# Patient Record
Sex: FEMALE | Race: WHITE | NOT HISPANIC OR LATINO | URBAN - METROPOLITAN AREA
[De-identification: names, ages, dates, MRNs, and addresses within clinical notes are randomized per-mention and may not be internally consistent; named-entity substitution may affect disease eponyms.]

---

## 2024-03-23 ENCOUNTER — INPATIENT (INPATIENT)
Facility: HOSPITAL | Age: 24
LOS: 2 days | Discharge: HOME | DRG: 315 | End: 2024-03-26
Attending: INTERNAL MEDICINE | Admitting: STUDENT IN AN ORGANIZED HEALTH CARE EDUCATION/TRAINING PROGRAM
Payer: COMMERCIAL

## 2024-03-23 VITALS
SYSTOLIC BLOOD PRESSURE: 112 MMHG | TEMPERATURE: 98 F | DIASTOLIC BLOOD PRESSURE: 73 MMHG | OXYGEN SATURATION: 99 % | RESPIRATION RATE: 20 BRPM | HEART RATE: 81 BPM

## 2024-03-23 DIAGNOSIS — N39.0 URINARY TRACT INFECTION, SITE NOT SPECIFIED: ICD-10-CM

## 2024-03-23 DIAGNOSIS — I51.4 MYOCARDITIS, UNSPECIFIED: ICD-10-CM

## 2024-03-23 LAB
ADD ON TEST-SPECIMEN IN LAB: SIGNIFICANT CHANGE UP
ALBUMIN SERPL ELPH-MCNC: 3.9 G/DL — SIGNIFICANT CHANGE UP (ref 3.3–5)
ALBUMIN SERPL ELPH-MCNC: 4 G/DL — SIGNIFICANT CHANGE UP (ref 3.3–5)
ALP SERPL-CCNC: 86 U/L — SIGNIFICANT CHANGE UP (ref 40–120)
ALP SERPL-CCNC: 90 U/L — SIGNIFICANT CHANGE UP (ref 40–120)
ALT FLD-CCNC: 31 U/L — SIGNIFICANT CHANGE UP (ref 10–45)
ALT FLD-CCNC: 33 U/L — SIGNIFICANT CHANGE UP (ref 10–45)
ANION GAP SERPL CALC-SCNC: 11 MMOL/L — SIGNIFICANT CHANGE UP (ref 5–17)
ANION GAP SERPL CALC-SCNC: 11 MMOL/L — SIGNIFICANT CHANGE UP (ref 5–17)
APPEARANCE UR: CLEAR — SIGNIFICANT CHANGE UP
AST SERPL-CCNC: 42 U/L — HIGH (ref 10–40)
AST SERPL-CCNC: 43 U/L — HIGH (ref 10–40)
BACTERIA # UR AUTO: ABNORMAL /HPF
BASOPHILS # BLD AUTO: 0.02 K/UL — SIGNIFICANT CHANGE UP (ref 0–0.2)
BASOPHILS # BLD AUTO: 0.03 K/UL — SIGNIFICANT CHANGE UP (ref 0–0.2)
BASOPHILS NFR BLD AUTO: 0.3 % — SIGNIFICANT CHANGE UP (ref 0–2)
BASOPHILS NFR BLD AUTO: 0.4 % — SIGNIFICANT CHANGE UP (ref 0–2)
BILIRUB SERPL-MCNC: <0.2 MG/DL — SIGNIFICANT CHANGE UP (ref 0.2–1.2)
BILIRUB SERPL-MCNC: <0.2 MG/DL — SIGNIFICANT CHANGE UP (ref 0.2–1.2)
BILIRUB UR-MCNC: NEGATIVE — SIGNIFICANT CHANGE UP
BUN SERPL-MCNC: 7 MG/DL — SIGNIFICANT CHANGE UP (ref 7–23)
BUN SERPL-MCNC: 8 MG/DL — SIGNIFICANT CHANGE UP (ref 7–23)
CALCIUM SERPL-MCNC: 10.1 MG/DL — SIGNIFICANT CHANGE UP (ref 8.4–10.5)
CALCIUM SERPL-MCNC: 9.7 MG/DL — SIGNIFICANT CHANGE UP (ref 8.4–10.5)
CAST: 2 /LPF — SIGNIFICANT CHANGE UP (ref 0–4)
CHLORIDE SERPL-SCNC: 102 MMOL/L — SIGNIFICANT CHANGE UP (ref 96–108)
CHLORIDE SERPL-SCNC: 97 MMOL/L — SIGNIFICANT CHANGE UP (ref 96–108)
CO2 SERPL-SCNC: 25 MMOL/L — SIGNIFICANT CHANGE UP (ref 22–31)
CO2 SERPL-SCNC: 26 MMOL/L — SIGNIFICANT CHANGE UP (ref 22–31)
COLOR SPEC: YELLOW — SIGNIFICANT CHANGE UP
CREAT SERPL-MCNC: 0.5 MG/DL — SIGNIFICANT CHANGE UP (ref 0.5–1.3)
CREAT SERPL-MCNC: 0.5 MG/DL — SIGNIFICANT CHANGE UP (ref 0.5–1.3)
DIFF PNL FLD: NEGATIVE — SIGNIFICANT CHANGE UP
EGFR: 137 ML/MIN/1.73M2 — SIGNIFICANT CHANGE UP
EGFR: 137 ML/MIN/1.73M2 — SIGNIFICANT CHANGE UP
EOSINOPHIL # BLD AUTO: 0.06 K/UL — SIGNIFICANT CHANGE UP (ref 0–0.5)
EOSINOPHIL # BLD AUTO: 0.08 K/UL — SIGNIFICANT CHANGE UP (ref 0–0.5)
EOSINOPHIL NFR BLD AUTO: 0.9 % — SIGNIFICANT CHANGE UP (ref 0–6)
EOSINOPHIL NFR BLD AUTO: 1.1 % — SIGNIFICANT CHANGE UP (ref 0–6)
GLUCOSE SERPL-MCNC: 87 MG/DL — SIGNIFICANT CHANGE UP (ref 70–99)
GLUCOSE SERPL-MCNC: 90 MG/DL — SIGNIFICANT CHANGE UP (ref 70–99)
GLUCOSE UR QL: NEGATIVE MG/DL — SIGNIFICANT CHANGE UP
HCG SERPL-ACNC: <1 MIU/ML — SIGNIFICANT CHANGE UP
HCG SERPL-ACNC: <1 MIU/ML — SIGNIFICANT CHANGE UP
HCT VFR BLD CALC: 34.2 % — LOW (ref 34.5–45)
HCT VFR BLD CALC: 35.8 % — SIGNIFICANT CHANGE UP (ref 34.5–45)
HGB BLD-MCNC: 11.9 G/DL — SIGNIFICANT CHANGE UP (ref 11.5–15.5)
HGB BLD-MCNC: 12.2 G/DL — SIGNIFICANT CHANGE UP (ref 11.5–15.5)
IMM GRANULOCYTES NFR BLD AUTO: 0.1 % — SIGNIFICANT CHANGE UP (ref 0–0.9)
IMM GRANULOCYTES NFR BLD AUTO: 0.3 % — SIGNIFICANT CHANGE UP (ref 0–0.9)
KETONES UR-MCNC: NEGATIVE MG/DL — SIGNIFICANT CHANGE UP
LEUKOCYTE ESTERASE UR-ACNC: ABNORMAL
LYMPHOCYTES # BLD AUTO: 3.01 K/UL — SIGNIFICANT CHANGE UP (ref 1–3.3)
LYMPHOCYTES # BLD AUTO: 3.05 K/UL — SIGNIFICANT CHANGE UP (ref 1–3.3)
LYMPHOCYTES # BLD AUTO: 43.6 % — SIGNIFICANT CHANGE UP (ref 13–44)
LYMPHOCYTES # BLD AUTO: 43.6 % — SIGNIFICANT CHANGE UP (ref 13–44)
MAGNESIUM SERPL-MCNC: 2.1 MG/DL — SIGNIFICANT CHANGE UP (ref 1.6–2.6)
MAGNESIUM SERPL-MCNC: 2.1 MG/DL — SIGNIFICANT CHANGE UP (ref 1.6–2.6)
MCHC RBC-ENTMCNC: 31.5 PG — SIGNIFICANT CHANGE UP (ref 27–34)
MCHC RBC-ENTMCNC: 31.9 PG — SIGNIFICANT CHANGE UP (ref 27–34)
MCHC RBC-ENTMCNC: 34.1 GM/DL — SIGNIFICANT CHANGE UP (ref 32–36)
MCHC RBC-ENTMCNC: 34.8 GM/DL — SIGNIFICANT CHANGE UP (ref 32–36)
MCV RBC AUTO: 91.7 FL — SIGNIFICANT CHANGE UP (ref 80–100)
MCV RBC AUTO: 92.5 FL — SIGNIFICANT CHANGE UP (ref 80–100)
MONOCYTES # BLD AUTO: 0.6 K/UL — SIGNIFICANT CHANGE UP (ref 0–0.9)
MONOCYTES # BLD AUTO: 0.61 K/UL — SIGNIFICANT CHANGE UP (ref 0–0.9)
MONOCYTES NFR BLD AUTO: 8.6 % — SIGNIFICANT CHANGE UP (ref 2–14)
MONOCYTES NFR BLD AUTO: 8.8 % — SIGNIFICANT CHANGE UP (ref 2–14)
NEUTROPHILS # BLD AUTO: 3.18 K/UL — SIGNIFICANT CHANGE UP (ref 1.8–7.4)
NEUTROPHILS # BLD AUTO: 3.23 K/UL — SIGNIFICANT CHANGE UP (ref 1.8–7.4)
NEUTROPHILS NFR BLD AUTO: 46.1 % — SIGNIFICANT CHANGE UP (ref 43–77)
NEUTROPHILS NFR BLD AUTO: 46.2 % — SIGNIFICANT CHANGE UP (ref 43–77)
NITRITE UR-MCNC: NEGATIVE — SIGNIFICANT CHANGE UP
NRBC # BLD: 0 /100 WBCS — SIGNIFICANT CHANGE UP (ref 0–0)
NRBC # BLD: 0 /100 WBCS — SIGNIFICANT CHANGE UP (ref 0–0)
PH UR: 8 — SIGNIFICANT CHANGE UP (ref 5–8)
PLATELET # BLD AUTO: 259 K/UL — SIGNIFICANT CHANGE UP (ref 150–400)
PLATELET # BLD AUTO: 294 K/UL — SIGNIFICANT CHANGE UP (ref 150–400)
POTASSIUM SERPL-MCNC: 3.9 MMOL/L — SIGNIFICANT CHANGE UP (ref 3.5–5.3)
POTASSIUM SERPL-MCNC: 4.5 MMOL/L — SIGNIFICANT CHANGE UP (ref 3.5–5.3)
POTASSIUM SERPL-SCNC: 3.9 MMOL/L — SIGNIFICANT CHANGE UP (ref 3.5–5.3)
POTASSIUM SERPL-SCNC: 4.5 MMOL/L — SIGNIFICANT CHANGE UP (ref 3.5–5.3)
PROT SERPL-MCNC: 6.8 G/DL — SIGNIFICANT CHANGE UP (ref 6–8.3)
PROT SERPL-MCNC: 7 G/DL — SIGNIFICANT CHANGE UP (ref 6–8.3)
PROT UR-MCNC: NEGATIVE MG/DL — SIGNIFICANT CHANGE UP
RBC # BLD: 3.73 M/UL — LOW (ref 3.8–5.2)
RBC # BLD: 3.87 M/UL — SIGNIFICANT CHANGE UP (ref 3.8–5.2)
RBC # FLD: 12 % — SIGNIFICANT CHANGE UP (ref 10.3–14.5)
RBC # FLD: 12.3 % — SIGNIFICANT CHANGE UP (ref 10.3–14.5)
RBC CASTS # UR COMP ASSIST: 1 /HPF — SIGNIFICANT CHANGE UP (ref 0–4)
SODIUM SERPL-SCNC: 134 MMOL/L — LOW (ref 135–145)
SODIUM SERPL-SCNC: 138 MMOL/L — SIGNIFICANT CHANGE UP (ref 135–145)
SP GR SPEC: 1.02 — SIGNIFICANT CHANGE UP (ref 1–1.03)
SQUAMOUS # UR AUTO: 7 /HPF — HIGH (ref 0–5)
UROBILINOGEN FLD QL: 1 MG/DL — SIGNIFICANT CHANGE UP (ref 0.2–1)
WBC # BLD: 6.9 K/UL — SIGNIFICANT CHANGE UP (ref 3.8–10.5)
WBC # BLD: 7 K/UL — SIGNIFICANT CHANGE UP (ref 3.8–10.5)
WBC # FLD AUTO: 6.9 K/UL — SIGNIFICANT CHANGE UP (ref 3.8–10.5)
WBC # FLD AUTO: 7 K/UL — SIGNIFICANT CHANGE UP (ref 3.8–10.5)
WBC UR QL: 5 /HPF — SIGNIFICANT CHANGE UP (ref 0–5)

## 2024-03-23 PROCEDURE — 99223 1ST HOSP IP/OBS HIGH 75: CPT

## 2024-03-23 PROCEDURE — 93010 ELECTROCARDIOGRAM REPORT: CPT

## 2024-03-23 RX ORDER — POTASSIUM CHLORIDE 20 MEQ
10 PACKET (EA) ORAL ONCE
Refills: 0 | Status: COMPLETED | OUTPATIENT
Start: 2024-03-23 | End: 2024-03-23

## 2024-03-23 RX ORDER — COLCHICINE 0.6 MG
0.6 TABLET ORAL DAILY
Refills: 0 | Status: DISCONTINUED | OUTPATIENT
Start: 2024-03-23 | End: 2024-03-24

## 2024-03-23 RX ORDER — CEFPODOXIME PROXETIL 100 MG
100 TABLET ORAL EVERY 12 HOURS
Refills: 0 | Status: DISCONTINUED | OUTPATIENT
Start: 2024-03-24 | End: 2024-03-26

## 2024-03-23 RX ORDER — ASPIRIN/CALCIUM CARB/MAGNESIUM 324 MG
81 TABLET ORAL DAILY
Refills: 0 | Status: DISCONTINUED | OUTPATIENT
Start: 2024-03-23 | End: 2024-03-24

## 2024-03-23 RX ORDER — CEFPODOXIME PROXETIL 100 MG
100 TABLET ORAL EVERY 12 HOURS
Refills: 0 | Status: DISCONTINUED | OUTPATIENT
Start: 2024-03-23 | End: 2024-03-23

## 2024-03-23 RX ADMIN — Medication 10 MILLIEQUIVALENT(S): at 23:07

## 2024-03-23 NOTE — H&P ADULT - SOCIAL HISTORY: SUBSTANCE USE
denies ABW(114.10#)  was used to calculate nutritional needs. Fluid needs defer to MD.  reports using ectasy and ketamine 2 weeks ago and between February-June of 2023, denies cocaine, marijuana or opioid use

## 2024-03-23 NOTE — H&P ADULT - ASSESSMENT
23 YOF, former smoker, family history of CAD (father with PCI), no significant PMHx, presents to [facility] c/o right sided chest pain w/ radiation to LUE and L neck and back starting at 830 am on 3/23/24. In the ER at [place], labs significant for HS Trop T 2241--> 4700, Sed Rate 50, , EKG NSR with TWI V2-V3  Bedside u/s without motion abnormalities, normal EF, no effusion per MD note. Myocarditis ruled in and patient is now transferred to [facility] for cardiac MRI and further management.        23 YOF, former smoker, family history of CAD (father with PCI), no significant PMHx, presents to [facility] c/o right sided chest pain w/ radiation to LUE and L neck and back associated with dyspnea starting at 830 am on 3/23/24. In the ER at [place], labs significant for HS Trop T 2241--> 4700, Sed Rate 50, , EKG NSR with TWI V2-V3  Bedside u/s without motion abnormalities, normal EF, no effusion per MD note. Myocarditis ruled in and patient is now transferred to [facility] for cardiac MRI and further management.

## 2024-03-23 NOTE — H&P ADULT - PROBLEM SELECTOR PLAN 1
ruled in ? myocarditis, Trop T 2241--> 4700, elevated CRP and sed rate, ER EKG w/ TWI leads V2-V3 however EKG on admission to [facility] unremarkable, denies any recent URI however recent UTI dx 2 days ago on Cefpodoxime, CT abd with ? pyelonephritis,  afebrile, no white count, non toxic appearing, VSS   - x1 episode of chest pain on admission to [facility]-- lasted 30 seconds and self resolved, EKG unchanged.   - drug screen negative, RVP negative,   - TTE ordered   - cMRI ordered   - f/u am labs: ABRAHAN, CRP, Trop, ESR,   - ordered coxsackie, EBV and parvovirus B19    - c/w colchicine 0.6mg ruled in ? myocarditis, Trop T 2241--> 4700, elevated CRP and sed rate, ER EKG w/ TWI leads V2-V3 however EKG on admission to [facility] unremarkable, denies any recent URI however recent UTI dx 2 days ago on Cefpodoxime, CT abd with ? pyelonephritis,  afebrile, no white count, non toxic appearing, VSS   - x1 episode of chest pain on admission to [facility]-- lasted 30 seconds and self resolved, EKG unchanged.   - drug screen negative however reports hx of ectasy and ketamine use (most recently 2 weeks ago), RVP negative,   - TTE ordered   - cMRI ordered   - f/u am labs: ABRAHAN, CRP, Trop, ESR,   - ordered coxsackie, EBV and parvovirus B19    - c/w colchicine 0.6mg BID and ASA 81mg

## 2024-03-23 NOTE — H&P ADULT - PROBLEM SELECTOR PLAN 2
diagnosed with UTI two days ago  and sent home on Cefpodoxime 100mg PO BID   -CT Abd/pelvis at [place] reporting L pyelonephritis-- patient afebrile, without white count stating her dysuria has improved.   - f/u UA and continue Cefpodoxime       #ELEVATED LDL   -  at [place] and given 1 dose of Atorvastatin 80mg   - +family history of CAD (father and grandfather with PCIs in their 50s)   -f/u lipid panel in AM and discuss with Dr. Shaw +/- statin therapy     F: None  E: Replete if K<4 or Mag<2  N: Regular diet (vegetarian)   GIppx: none   VTEppx: none patient ambulatory and VTE low risk    Dispo: pending cMRI diagnosed with UTI two days ago  and sent home on Cefpodoxime 100mg PO BID   -CT Abd/pelvis at [place] reporting L pyelonephritis-- patient afebrile, without white count stating her dysuria has improved, non toxic appearing   - f/u UA and continue Cefpodoxime 100mg PO BID       #ELEVATED LDL   -  at [place] and given 1 dose of Atorvastatin 80mg   - +family history of CAD (father and grandfather with PCIs in their 50s)   -f/u lipid panel in AM and discuss with Dr. Shaw +/- statin therapy     F: None  E: Replete if K<4 or Mag<2  N: Regular diet (vegetarian)   GIppx: none   VTEppx: none patient ambulatory and VTE low risk    Dispo: pending cMRI diagnosed with UTI two days ago  and sent home on Cefpodoxime 100mg PO BID   -CT Abd/pelvis at [place] reporting L pyelonephritis-- patient afebrile, without white count stating her dysuria has improved, non toxic appearing   -UA with trace leuk esterase   - continue Cefpodoxime 100mg PO BID to complete course       #ELEVATED LDL   -  at [place] and given 1 dose of Atorvastatin 80mg   - +family history of CAD (father and grandfather with PCIs in their 50s)   -f/u lipid panel in AM and discuss with Dr. Shaw +/- statin therapy     F: None  E: Replete if K<4 or Mag<2  N: Regular diet (vegetarian)   GIppx: none   VTEppx: none patient ambulatory and VTE low risk    Dispo: pending cMRI diagnosed with UTI two days ago  and sent home on Cefpodoxime 100mg PO BID   -CT Abd/pelvis at [place] reporting L pyelonephritis-- patient afebrile, without white count stating her dysuria has improved, non toxic appearing   -UA with trace leuk esterase   - continue Cefpodoxime 100mg PO BID to complete course (9 doses left)       #ELEVATED LDL   -  at [place] and given 1 dose of Atorvastatin 80mg   - +family history of CAD (father and grandfather with PCIs in their 50s)   -f/u lipid panel in AM and discuss with Dr. Shaw +/- statin therapy     F: None  E: Replete if K<4 or Mag<2  N: Regular diet (vegetarian)   GIppx: none   VTEppx: none patient ambulatory and VTE low risk    Dispo: pending cMRI

## 2024-03-23 NOTE — H&P ADULT - NSHPLABSRESULTS_GEN_ALL_CORE
11.9   6.90  )-----------( 259      ( 23 Mar 2024 21:40 )             34.2       03-23    138  |  102  |  7   ----------------------------<  87  3.9   |  25  |  0.50    Ca    9.7      23 Mar 2024 21:40  Mg     2.1     03-23    TPro  6.8  /  Alb  3.9  /  TBili  <0.2  /  DBili  x   /  AST  42<H>  /  ALT  31  /  AlkPhos  86  03-23                Urinalysis Basic - ( 23 Mar 2024 21:40 )    Color: x / Appearance: x / SG: x / pH: x  Gluc: 87 mg/dL / Ketone: x  / Bili: x / Urobili: x   Blood: x / Protein: x / Nitrite: x   Leuk Esterase: x / RBC: x / WBC x   Sq Epi: x / Non Sq Epi: x / Bacteria: x        EKG: NSR 73 BPM without ischemic changes.

## 2024-03-23 NOTE — H&P ADULT - SOCIAL HISTORY: TOBACCO USE
former smoker former smoker--- quit 1 week ago, smoked x7 years 1/2 ppd former smoker--- quit 1 week ago, smoked x7 years 1/2 ppd mixed with vaping former smoker--- quit 1 week ago, smoked x7 years 1/2 ppd (vaping + cigarettes)

## 2024-03-23 NOTE — H&P ADULT - HISTORY OF PRESENT ILLNESS
23 YOF, former smoker, family history of CAD (father with PCI), no significant PMHx, presents to [facility] c/o right sided chest pain w/ radiation to LUE and L neck and back starting at 830 am on 3/23/24. Reported chest pain worsened with movement and denied ever having sx like this in the past. Patient denied any strenuous exercies, recent trauma, recent travel, denies being on OCPs and no personal or family hx of DVT/VTE. Patient denies regular ETOH use, denied illicit drug use however reports being a former smoker (1ppd every 3 days and quit_____). In the ER at [place], labs significant for HS Trop T 2241--> 4700, Sed Rate 50, , EKG NSR with TWI V2-V3, Ct abdomen likely positive for pyelonephritis. Drug screen and RVP negative. Bedside u/s without motion abnormalities, normal EF, no effusion per MD note. Myocarditis ruled in and patient is now transferred to [facility] for cardiac MRI and further management. Upon arrival to [facility], patient in stable condition, VSS and chest pain free. Patient denies chest pain, SOB, KENT, palpitations, dizziness, LOC, N/V/D, fever/chills/sick contact, diaphoresis, orthopnea/PND, and leg swelling.     IMAGING AT [place]  -CXR: no evidence of acute pulmonary disease   -CT Chest, Abd, Pelvis with IVC: no evidence of dissection, wedge shape of the lucency in the upper pole of the L kidney suspicious for pyelonephritis or focal infarction clinical correlation recommended Evidence of constipation.        23 YOF, former smoker, family history of CAD (father with PCI), no significant PMHx, presents to [facility] c/o right sided chest pain w/ radiation to LUE and L neck and back starting at 830 am on 3/23/24. Reported chest pain worsened with movement and denied ever having sx like this in the past. Patient denied any strenuous exercies, recent trauma, recent travel, denies being on OCPs and no personal or family hx of DVT/VTE. Patient denies regular ETOH use, denied illicit drug use however reports being a former smoker (1ppd every 3 days and quit_____). Reported visiting the ER two days ago for dysuria and diagnosed with UTI. She was sent home on a course of Cefpodoxime 100mg PO BID for 7 days. In the ER at [place], labs significant for HS Trop T 2241--> 4700, Sed Rate 50, , EKG NSR with TWI V2-V3, Ct abdomen likely positive for pyelonephritis. Drug screen and RVP negative. Bedside u/s without motion abnormalities, normal EF, no effusion per MD note. Myocarditis ruled in and patient is now transferred to [facility] for cardiac MRI and further management. Upon arrival to [facility], patient in stable condition, VSS and chest pain free. Patient denies chest pain, SOB, KENT, palpitations, dizziness, LOC, N/V/D, fever/chills/sick contact, diaphoresis, orthopnea/PND, and leg swelling.     IMAGING AT [place]  -CXR: no evidence of acute pulmonary disease   -CT Chest, Abd, Pelvis with IVC: no evidence of dissection, wedge shape of the lucency in the upper pole of the L kidney suspicious for pyelonephritis or focal infarction clinical correlation recommended Evidence of constipation.        23 YOF, former smoker, family history of CAD (father with PCI), no significant PMHx, presents to [facility] c/o right sided chest pain w/ radiation to LUE and L neck and back associated with dyspnea starting at 830 am on 3/23/24. Reported chest pain worsened with movement and denied ever having sx like this in the past. Patient denied any strenuous exercies, recent trauma, recent travel, denies being on OCPs and no personal or family hx of DVT/VTE. Patient denies regular ETOH use, denied illicit drug use however reports being a former smoker (1/2 PPD x7 years quit 1 week ago). Reported visiting the ER two days ago for dysuria and diagnosed with UTI. She was sent home on a course of Cefpodoxime 100mg PO BID for 7 days. In the ER at [place], labs significant for HS Trop T 2241--> 4700, Sed Rate 50, , EKG NSR with TWI V2-V3, Ct abdomen likely positive for pyelonephritis. Drug screen and RVP negative. Bedside u/s without motion abnormalities, normal EF, no effusion per MD note. Myocarditis ruled in and patient is now transferred to [facility] for cardiac MRI and further management. Upon arrival to [facility], patient in stable condition, VSS and chest pain free. Patient denies chest pain, SOB, KENT, palpitations, dizziness, LOC, N/V/D, fever/chills/sick contact, diaphoresis, orthopnea/PND, and leg swelling.     IMAGING AT [place]  -CXR: no evidence of acute pulmonary disease   -CT Chest, Abd, Pelvis with IVC: no evidence of dissection, wedge shape of the lucency in the upper pole of the L kidney suspicious for pyelonephritis or focal infarction clinical correlation recommended Evidence of constipation.        23 YOF, former smoker, family history of CAD (father with PCI), no significant PMHx, presents to [facility] c/o right sided chest pain w/ radiation to LUE and L neck and back associated with dyspnea starting at 830 am on 3/23/24. Reported chest pain worsened with movement and denied ever having sx like this in the past. Patient denied any strenuous exercies, recent trauma, recent travel, denies being on OCPs and no personal or family hx of DVT/VTE. Patient denies regular ETOH use, denied illicit drug use however reports being a former smoker (1/2 PPD x7 years quit 1 week ago). Reported visiting the ER two days ago for dysuria and diagnosed with UTI. She was sent home on a course of Cefpodoxime 100mg PO BID for 7 days. In the ER at [place], labs significant for HS Trop T 2241--> 4700, Sed Rate 50, , EKG NSR with TWI V2-V3, Ct abdomen likely positive for pyelonephritis. Drug screen and RVP negative. Bedside u/s without motion abnormalities, normal EF, no effusion per MD note. Myocarditis ruled in and patient is now transferred to [facility] for cardiac MRI and further management. Upon arrival to [facility], patient in stable condition, VSS and chest pain free. Patient denies chest pain, SOB, KENT, palpitations, dizziness, LOC, N/V/D, fever/chills/sick contact, diaphoresis, orthopnea/PND, and leg swelling.     IMAGING AT [place]:   -CXR: no evidence of acute pulmonary disease   -CT Chest, Abd, Pelvis with IVC: no evidence of dissection, wedge shape of the lucency in the upper pole of the L kidney suspicious for pyelonephritis or focal infarction clinical correlation recommended Evidence of constipation.

## 2024-03-23 NOTE — H&P ADULT - NSICDXFAMILYHX_GEN_ALL_CORE_FT
FAMILY HISTORY:  Father  Still living? Unknown  FH: CAD (coronary artery disease), Age at diagnosis: Age Unknown    Grandparent  Still living? Unknown  FH: CAD (coronary artery disease), Age at diagnosis: Age Unknown

## 2024-03-23 NOTE — H&P ADULT - DP PULSE
right normal/left normal Burow's Graft Text: The defect edges were debeveled with a #15 scalpel blade.  Given the location of the defect, shape of the defect, the proximity to free margins and the presence of a standing cone deformity a Burow's skin graft was deemed most appropriate. The standing cone was removed and this tissue was then trimmed to the shape of the primary defect. The adipose tissue was also removed until only dermis and epidermis were left.  The skin margins of the secondary defect were undermined to an appropriate distance in all directions utilizing iris scissors.  The secondary defect was closed with interrupted buried subcutaneous sutures.  The skin edges were then re-apposed with running  sutures.  The skin graft was then placed in the primary defect and oriented appropriately.

## 2024-03-23 NOTE — H&P ADULT - NS ATTEND AMEND GEN_ALL_CORE FT
22 yo lady PMHx of substance use disorder (ecstasy/MDMA 2 weeks ago) and recent UTI who was transferred from the [facility] after presenting there with chest pain and dyspnea on exertion since 03/23/24.     Assessment  1. Pleuritic chest pain, elevated inflammatory markers, and hsTrop concerning for myopericarditis  2. Substance use disorder   Ecstasy/MDMA use 2 weeks ago  UTox negative at [facility]    Plan  1. Obtain TTE -> likely to need cardiac MRI as well -> if with reduced EF will start GDMT  2. Ibuprofen 600mg q8h and colchicine 0.6mg BID  3. Not concerned about type 1 MI given pleuritic chest pain w/ inflammatory markers, but ESR/CRP could be elevated in setting of recent UTI and she does have FMHx of CAD s/p PCI so will obtain CCTA   4. C/w cefpodoxime which she was taking for UTI  5. Warm and dry on exam    During non face-to-face time, I reviewed relevant portions of the patient’s medical record. During face-to-face time, I took a relevant history and examined the patient. I also explained differential diagnoses, relevant cardiac diagnoses, workup, and management plan, which required a high level of medical decision making. I answered all questions related to the patient's medical conditions.     Nirav Shaw M.D.  Attending Cardiologist  [facility]

## 2024-03-24 LAB
A1C WITH ESTIMATED AVERAGE GLUCOSE RESULT: 5.1 % — SIGNIFICANT CHANGE UP (ref 4–5.6)
ALBUMIN SERPL ELPH-MCNC: 3.9 G/DL — SIGNIFICANT CHANGE UP (ref 3.3–5)
ALP SERPL-CCNC: 86 U/L — SIGNIFICANT CHANGE UP (ref 40–120)
ALT FLD-CCNC: 30 U/L — SIGNIFICANT CHANGE UP (ref 10–45)
ANION GAP SERPL CALC-SCNC: 14 MMOL/L — SIGNIFICANT CHANGE UP (ref 5–17)
AST SERPL-CCNC: 37 U/L — SIGNIFICANT CHANGE UP (ref 10–40)
BASOPHILS # BLD AUTO: 0.03 K/UL — SIGNIFICANT CHANGE UP (ref 0–0.2)
BASOPHILS NFR BLD AUTO: 0.5 % — SIGNIFICANT CHANGE UP (ref 0–2)
BILIRUB SERPL-MCNC: <0.2 MG/DL — SIGNIFICANT CHANGE UP (ref 0.2–1.2)
BUN SERPL-MCNC: 8 MG/DL — SIGNIFICANT CHANGE UP (ref 7–23)
CALCIUM SERPL-MCNC: 10.1 MG/DL — SIGNIFICANT CHANGE UP (ref 8.4–10.5)
CHLORIDE SERPL-SCNC: 104 MMOL/L — SIGNIFICANT CHANGE UP (ref 96–108)
CHOLEST SERPL-MCNC: 184 MG/DL — SIGNIFICANT CHANGE UP
CK MB CFR SERPL CALC: 6.8 NG/ML — HIGH (ref 0–6.7)
CK MB CFR SERPL CALC: 9.4 NG/ML — HIGH (ref 0–6.7)
CK SERPL-CCNC: 171 U/L — HIGH (ref 25–170)
CK SERPL-CCNC: 198 U/L — HIGH (ref 25–170)
CO2 SERPL-SCNC: 25 MMOL/L — SIGNIFICANT CHANGE UP (ref 22–31)
CREAT SERPL-MCNC: 0.51 MG/DL — SIGNIFICANT CHANGE UP (ref 0.5–1.3)
CRP SERPL-MCNC: 59.1 MG/L — HIGH (ref 0–4)
EGFR: 136 ML/MIN/1.73M2 — SIGNIFICANT CHANGE UP
EOSINOPHIL # BLD AUTO: 0.12 K/UL — SIGNIFICANT CHANGE UP (ref 0–0.5)
EOSINOPHIL NFR BLD AUTO: 2.1 % — SIGNIFICANT CHANGE UP (ref 0–6)
ERYTHROCYTE [SEDIMENTATION RATE] IN BLOOD: 80 MM/HR — HIGH
ESTIMATED AVERAGE GLUCOSE: 100 MG/DL — SIGNIFICANT CHANGE UP (ref 68–114)
GLUCOSE SERPL-MCNC: 110 MG/DL — HIGH (ref 70–99)
HCT VFR BLD CALC: 33.9 % — LOW (ref 34.5–45)
HDLC SERPL-MCNC: 24 MG/DL — LOW
HGB BLD-MCNC: 11.5 G/DL — SIGNIFICANT CHANGE UP (ref 11.5–15.5)
IMM GRANULOCYTES NFR BLD AUTO: 0.2 % — SIGNIFICANT CHANGE UP (ref 0–0.9)
LIPID PNL WITH DIRECT LDL SERPL: 132 MG/DL — HIGH
LYMPHOCYTES # BLD AUTO: 2.87 K/UL — SIGNIFICANT CHANGE UP (ref 1–3.3)
LYMPHOCYTES # BLD AUTO: 50.8 % — HIGH (ref 13–44)
MAGNESIUM SERPL-MCNC: 2.7 MG/DL — HIGH (ref 1.6–2.6)
MCHC RBC-ENTMCNC: 31.4 PG — SIGNIFICANT CHANGE UP (ref 27–34)
MCHC RBC-ENTMCNC: 33.9 GM/DL — SIGNIFICANT CHANGE UP (ref 32–36)
MCV RBC AUTO: 92.6 FL — SIGNIFICANT CHANGE UP (ref 80–100)
MONOCYTES # BLD AUTO: 0.48 K/UL — SIGNIFICANT CHANGE UP (ref 0–0.9)
MONOCYTES NFR BLD AUTO: 8.5 % — SIGNIFICANT CHANGE UP (ref 2–14)
NEUTROPHILS # BLD AUTO: 2.14 K/UL — SIGNIFICANT CHANGE UP (ref 1.8–7.4)
NEUTROPHILS NFR BLD AUTO: 37.9 % — LOW (ref 43–77)
NON HDL CHOLESTEROL: 160 MG/DL — HIGH
NRBC # BLD: 0 /100 WBCS — SIGNIFICANT CHANGE UP (ref 0–0)
NT-PROBNP SERPL-SCNC: 394 PG/ML — HIGH (ref 0–300)
PLATELET # BLD AUTO: 265 K/UL — SIGNIFICANT CHANGE UP (ref 150–400)
POTASSIUM SERPL-MCNC: 4 MMOL/L — SIGNIFICANT CHANGE UP (ref 3.5–5.3)
POTASSIUM SERPL-SCNC: 4 MMOL/L — SIGNIFICANT CHANGE UP (ref 3.5–5.3)
PROT SERPL-MCNC: 6.9 G/DL — SIGNIFICANT CHANGE UP (ref 6–8.3)
RAPID RVP RESULT: SIGNIFICANT CHANGE UP
RBC # BLD: 3.66 M/UL — LOW (ref 3.8–5.2)
RBC # FLD: 12 % — SIGNIFICANT CHANGE UP (ref 10.3–14.5)
SARS-COV-2 RNA SPEC QL NAA+PROBE: SIGNIFICANT CHANGE UP
SODIUM SERPL-SCNC: 143 MMOL/L — SIGNIFICANT CHANGE UP (ref 135–145)
TRIGL SERPL-MCNC: 155 MG/DL — HIGH
TROPONIN T, HIGH SENSITIVITY RESULT: 211 NG/L — CRITICAL HIGH (ref 0–51)
TROPONIN T, HIGH SENSITIVITY RESULT: 244 NG/L — CRITICAL HIGH (ref 0–51)
WBC # BLD: 5.65 K/UL — SIGNIFICANT CHANGE UP (ref 3.8–10.5)
WBC # FLD AUTO: 5.65 K/UL — SIGNIFICANT CHANGE UP (ref 3.8–10.5)

## 2024-03-24 PROCEDURE — 93308 TTE F-UP OR LMTD: CPT | Mod: 26

## 2024-03-24 PROCEDURE — 99232 SBSQ HOSP IP/OBS MODERATE 35: CPT

## 2024-03-24 RX ORDER — COLCHICINE 0.6 MG
0.6 TABLET ORAL EVERY 12 HOURS
Refills: 0 | Status: DISCONTINUED | OUTPATIENT
Start: 2024-03-24 | End: 2024-03-26

## 2024-03-24 RX ORDER — IBUPROFEN 200 MG
600 TABLET ORAL EVERY 8 HOURS
Refills: 0 | Status: DISCONTINUED | OUTPATIENT
Start: 2024-03-24 | End: 2024-03-26

## 2024-03-24 RX ADMIN — Medication 100 MILLIGRAM(S): at 06:27

## 2024-03-24 RX ADMIN — Medication 81 MILLIGRAM(S): at 11:50

## 2024-03-24 RX ADMIN — Medication 600 MILLIGRAM(S): at 22:14

## 2024-03-24 RX ADMIN — Medication 0.6 MILLIGRAM(S): at 17:22

## 2024-03-24 RX ADMIN — Medication 600 MILLIGRAM(S): at 14:23

## 2024-03-24 RX ADMIN — Medication 100 MILLIGRAM(S): at 17:22

## 2024-03-24 RX ADMIN — Medication 0.6 MILLIGRAM(S): at 11:49

## 2024-03-24 RX ADMIN — Medication 600 MILLIGRAM(S): at 21:14

## 2024-03-24 NOTE — PROGRESS NOTE ADULT - PROBLEM SELECTOR PLAN 2
diagnosed with UTI two days ago  and sent home on Cefpodoxime 100mg PO BID   -CT Abd/pelvis at [place] reporting L pyelonephritis-- patient afebrile, without white count stating her dysuria has improved, non toxic appearing   -UA with trace leuk esterase   - continue Cefpodoxime 100mg PO BID to complete course (9 doses left)       #ELEVATED LDL   -  at [place] and given 1 dose of Atorvastatin 80mg   - +family history of CAD (father and grandfather with PCIs in their 50s)   -f/u lipid panel in AM and discuss with Dr. Shaw +/- statin therapy     F: None  E: Replete if K<4 or Mag<2  N: Regular diet (vegetarian)   GIppx: none   VTEppx: none patient ambulatory and VTE low risk    Dispo: pending cMRI diagnosed with UTI two days ago and sent home on Cefpodoxime 100mg PO BID   -CT A/P at [place] reporting L pyelonephritis-- pt afebrile, no white count, dysuria has improved, non toxic appearing   -UA with trace leuk esterase   -c/w Cefpodoxime 100mg PO BID to complete course      #ELEVATED LDL   -  at [place] and given 1 dose of Atorvastatin 80mg   - Discussed importance of lifestyle modifications w/ pt including diet and exercise  - Will hold off on medical therapy for now    F: None  E: Replete if K<4 or Mag<2  N: Regular diet (vegetarian)   GIppx: none   VTEppx: none patient ambulatory and VTE low risk    Dispo: pending cMRI, CCTA. TTE

## 2024-03-24 NOTE — PROGRESS NOTE ADULT - PROBLEM SELECTOR PLAN 1
ruled in ? myocarditis, Trop T 2241--> 4700, elevated CRP and sed rate, ER EKG w/ TWI leads V2-V3 however EKG on admission to [facility] unremarkable, denies any recent URI however recent UTI dx 2 days ago on Cefpodoxime, CT abd with ? pyelonephritis,  afebrile, no white count, non toxic appearing, VSS   - x1 episode of chest pain on admission to [facility]-- lasted 30 seconds and self resolved, EKG unchanged.   - drug screen negative however reports hx of ectasy and ketamine use (most recently 2 weeks ago), RVP negative,   - TTE ordered   - cMRI ordered   - f/u am labs: ABRAHAN, CRP, Trop, ESR,   - ordered coxsackie, EBV and parvovirus B19    - c/w colchicine 0.6mg BID and ASA 81mg Trop I 2241--> 4700, CRP 59.1, ESR 80,  EKG w/ TWI leads V2-V3 at [place]   -At [facility], EKG NSR w/ no acute abnormality. Trop T 244, , CKMB 9.4. Trend CE to peak.  -TTE ordered. [place] [facility] reported normal EF w/ no WMA per MD note  -CMR pending  -In light of multiple risk factors, premature CAD and smoking hx, recommend CCTA to r/o CAD  -Drug screen neg however reports hx of ectasy and ketamine use (most recently 2 weeks ago)  -Afebrile, No white count, however pt reports recent fevers the past week undergoing tx for UTI  -RVP pending  -Given most likely myopericarditis dx, start colchicine 0.6mg BID and ibuprofen 600 mg TID (dec dose by 200 mg every 1-2 weeks)

## 2024-03-24 NOTE — PROGRESS NOTE ADULT - SUBJECTIVE AND OBJECTIVE BOX
Cardiology PA Adult Progress Note    SUBJECTIVE ASSESSMENT:  	  MEDICATIONS:  cefpodoxime 100 milliGRAM(s) Oral every 12 hours  colchicine 0.6 milliGRAM(s) Oral daily  aspirin  chewable 81 milliGRAM(s) Oral daily    VITAL SIGNS:  T(C): 36.6 (03-24-24 @ 09:16), Max: 36.7 (03-24-24 @ 06:47)  HR: 72 (03-24-24 @ 09:16) (65 - 81)  BP: 105/55 (03-24-24 @ 09:16) (95/50 - 112/73)  RR: 17 (03-24-24 @ 09:16) (16 - 20)  SpO2: 98% (03-24-24 @ 09:16) (97% - 99%)  Wt(kg): --    I&O's Summary    24 Mar 2024 07:01  -  24 Mar 2024 09:43  --------------------------------------------------------  IN: 0 mL / OUT: 600 mL / NET: -600 mL                                   PHYSICAL EXAM:  Appearance: Normal	  HEENT: Normal oral mucosa, PERRL, EOMI	  Neck: Supple, + JVD/ - JVD; ___ Carotid Bruit   Cardiovascular: Normal S1 S2, No murmurs  Respiratory: Lungs clear to auscultation/Decreased Breath Sounds/No Rales, Rhonchi, Wheezing	  Gastrointestinal:  Soft, Non-tender, + BS	  Skin: No rashes, No ecchymoses, No cyanosis  Extremities: Normal range of motion, No clubbing, cyanosis or edema  Vascular: Peripheral pulses palpable 2+ bilaterally  Neurologic: Non-focal  Psychiatry: A & O x 3, Mood & affect appropriate    LABS:	                         11.5   5.65  )-----------( 265      ( 24 Mar 2024 05:30 )             33.9     03-24    143  |  104  |  8   ----------------------------<  110<H>  4.0   |  25  |  0.51    Ca    10.1      24 Mar 2024 05:30  Mg     2.7     03-24    TPro  6.9  /  Alb  3.9  /  TBili  <0.2  /  DBili  x   /  AST  37  /  ALT  30  /  AlkPhos  86  03-24   Cardiology PA Adult Progress Note    SUBJECTIVE ASSESSMENT: Seen and examined at bedside. Pt is feeling better today. Reports she does have pleuritic CP with deep breathing. Denies palpitations, SOB or any other sx.   	  MEDICATIONS:  cefpodoxime 100 milliGRAM(s) Oral every 12 hours  colchicine 0.6 milliGRAM(s) Oral daily  aspirin  chewable 81 milliGRAM(s) Oral daily    VITAL SIGNS:  T(C): 36.6 (03-24-24 @ 09:16), Max: 36.7 (03-24-24 @ 06:47)  HR: 72 (03-24-24 @ 09:16) (65 - 81)  BP: 105/55 (03-24-24 @ 09:16) (95/50 - 112/73)  RR: 17 (03-24-24 @ 09:16) (16 - 20)  SpO2: 98% (03-24-24 @ 09:16) (97% - 99%)  Wt(kg): --    I&O's Summary    24 Mar 2024 07:01  -  24 Mar 2024 09:43  --------------------------------------------------------  IN: 0 mL / OUT: 600 mL / NET: -600 mL                      PHYSICAL EXAM:  Appearance: Normal	  HEENT: Normal oral mucosa, PERRL, EOMI	  Neck: Supple, - JVD; No Carotid Bruit   Cardiovascular: Normal S1 S2, No murmurs  Respiratory: Lungs clear to auscultation  Gastrointestinal:  Soft, Non-tender, + BS	  Skin: No rashes, No ecchymoses, No cyanosis  Extremities: Normal range of motion, No clubbing, cyanosis or edema  Vascular: Peripheral pulses palpable 2+ bilaterally  Neurologic: Non-focal  Psychiatry: A & O x 3, Mood & affect appropriate    LABS:	                         11.5   5.65  )-----------( 265      ( 24 Mar 2024 05:30 )             33.9     03-24    143  |  104  |  8   ----------------------------<  110<H>  4.0   |  25  |  0.51    Ca    10.1      24 Mar 2024 05:30  Mg     2.7     03-24    TPro  6.9  /  Alb  3.9  /  TBili  <0.2  /  DBili  x   /  AST  37  /  ALT  30  /  AlkPhos  86  03-24

## 2024-03-24 NOTE — PROGRESS NOTE ADULT - ASSESSMENT
23 YOF, former smoker, family history of CAD (father with PCI), no significant PMHx, presents to [facility] c/o right sided chest pain w/ radiation to LUE and L neck and back associated with dyspnea starting at 830 am on 3/23/24. In the ER at [place], labs significant for HS Trop T 2241--> 4700, Sed Rate 50, , EKG NSR with TWI V2-V3  Bedside u/s without motion abnormalities, normal EF, no effusion per MD note. Myocarditis ruled in and patient is now transferred to [facility] for cardiac MRI and further management.              24 y/o F, former smoker, family history of CAD (father MI age 51), no significant PMHx, presents to [facility] c/o right sided chest pain w/ radiation to LUE, neck and back associated w/ dyspnea starting at 8:30 am on 3/23/24. At [place], labs significant for HS Trop T 2241--> 4700, ESR 50, , EKG NSR with TWI V2-V3.  POCUS w/o WMA, normal EF per MD. Transferred to [facility] for CMR. Plan for TTE and CCTA in AM, pending CMR.

## 2024-03-25 ENCOUNTER — TRANSCRIPTION ENCOUNTER (OUTPATIENT)
Age: 24
End: 2024-03-25

## 2024-03-25 DIAGNOSIS — R45.89 OTHER SYMPTOMS AND SIGNS INVOLVING EMOTIONAL STATE: ICD-10-CM

## 2024-03-25 LAB
ANION GAP SERPL CALC-SCNC: 7 MMOL/L — SIGNIFICANT CHANGE UP (ref 5–17)
BASOPHILS # BLD AUTO: 0.03 K/UL — SIGNIFICANT CHANGE UP (ref 0–0.2)
BASOPHILS NFR BLD AUTO: 0.4 % — SIGNIFICANT CHANGE UP (ref 0–2)
BUN SERPL-MCNC: 14 MG/DL — SIGNIFICANT CHANGE UP (ref 7–23)
CALCIUM SERPL-MCNC: 9.7 MG/DL — SIGNIFICANT CHANGE UP (ref 8.4–10.5)
CHLORIDE SERPL-SCNC: 102 MMOL/L — SIGNIFICANT CHANGE UP (ref 96–108)
CO2 SERPL-SCNC: 25 MMOL/L — SIGNIFICANT CHANGE UP (ref 22–31)
CREAT SERPL-MCNC: 0.55 MG/DL — SIGNIFICANT CHANGE UP (ref 0.5–1.3)
EGFR: 134 ML/MIN/1.73M2 — SIGNIFICANT CHANGE UP
EOSINOPHIL # BLD AUTO: 0.11 K/UL — SIGNIFICANT CHANGE UP (ref 0–0.5)
EOSINOPHIL NFR BLD AUTO: 1.6 % — SIGNIFICANT CHANGE UP (ref 0–6)
GLUCOSE SERPL-MCNC: 97 MG/DL — SIGNIFICANT CHANGE UP (ref 70–99)
HCT VFR BLD CALC: 33.4 % — LOW (ref 34.5–45)
HGB BLD-MCNC: 11.8 G/DL — SIGNIFICANT CHANGE UP (ref 11.5–15.5)
IMM GRANULOCYTES NFR BLD AUTO: 0.9 % — SIGNIFICANT CHANGE UP (ref 0–0.9)
LYMPHOCYTES # BLD AUTO: 3.26 K/UL — SIGNIFICANT CHANGE UP (ref 1–3.3)
LYMPHOCYTES # BLD AUTO: 47.8 % — HIGH (ref 13–44)
MAGNESIUM SERPL-MCNC: 2.1 MG/DL — SIGNIFICANT CHANGE UP (ref 1.6–2.6)
MCHC RBC-ENTMCNC: 31.8 PG — SIGNIFICANT CHANGE UP (ref 27–34)
MCHC RBC-ENTMCNC: 35.3 GM/DL — SIGNIFICANT CHANGE UP (ref 32–36)
MCV RBC AUTO: 90 FL — SIGNIFICANT CHANGE UP (ref 80–100)
MONOCYTES # BLD AUTO: 0.55 K/UL — SIGNIFICANT CHANGE UP (ref 0–0.9)
MONOCYTES NFR BLD AUTO: 8.1 % — SIGNIFICANT CHANGE UP (ref 2–14)
NEUTROPHILS # BLD AUTO: 2.81 K/UL — SIGNIFICANT CHANGE UP (ref 1.8–7.4)
NEUTROPHILS NFR BLD AUTO: 41.2 % — LOW (ref 43–77)
NRBC # BLD: 0 /100 WBCS — SIGNIFICANT CHANGE UP (ref 0–0)
PLATELET # BLD AUTO: 302 K/UL — SIGNIFICANT CHANGE UP (ref 150–400)
POTASSIUM SERPL-MCNC: 4.1 MMOL/L — SIGNIFICANT CHANGE UP (ref 3.5–5.3)
POTASSIUM SERPL-SCNC: 4.1 MMOL/L — SIGNIFICANT CHANGE UP (ref 3.5–5.3)
RBC # BLD: 3.71 M/UL — LOW (ref 3.8–5.2)
RBC # FLD: 12 % — SIGNIFICANT CHANGE UP (ref 10.3–14.5)
SODIUM SERPL-SCNC: 134 MMOL/L — LOW (ref 135–145)
WBC # BLD: 6.82 K/UL — SIGNIFICANT CHANGE UP (ref 3.8–10.5)
WBC # FLD AUTO: 6.82 K/UL — SIGNIFICANT CHANGE UP (ref 3.8–10.5)

## 2024-03-25 PROCEDURE — 93306 TTE W/DOPPLER COMPLETE: CPT | Mod: 26

## 2024-03-25 PROCEDURE — 75561 CARDIAC MRI FOR MORPH W/DYE: CPT | Mod: 26

## 2024-03-25 PROCEDURE — 75574 CT ANGIO HRT W/3D IMAGE: CPT | Mod: 26

## 2024-03-25 RX ADMIN — Medication 100 MILLIGRAM(S): at 17:17

## 2024-03-25 RX ADMIN — Medication 600 MILLIGRAM(S): at 22:11

## 2024-03-25 RX ADMIN — Medication 0.6 MILLIGRAM(S): at 17:17

## 2024-03-25 RX ADMIN — Medication 600 MILLIGRAM(S): at 05:26

## 2024-03-25 RX ADMIN — Medication 600 MILLIGRAM(S): at 13:05

## 2024-03-25 RX ADMIN — Medication 600 MILLIGRAM(S): at 05:22

## 2024-03-25 RX ADMIN — Medication 600 MILLIGRAM(S): at 15:31

## 2024-03-25 RX ADMIN — Medication 0.6 MILLIGRAM(S): at 05:22

## 2024-03-25 RX ADMIN — Medication 100 MILLIGRAM(S): at 05:22
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
## 2024-03-25 NOTE — PROGRESS NOTE ADULT - PROBLEM SELECTOR PLAN 2
endorses feelings of hopelessness / depression in past year  - pt has no family in [facility], denies suicidal ideation  - discussed with patient, agreed to psych consult  - f/u psych recs

## 2024-03-25 NOTE — PROGRESS NOTE ADULT - PROBLEM SELECTOR PLAN 1
Trop I 2241--> 4700, CRP 59.1, ESR 80,  EKG w/ TWI leads V2-V3 at [place]   - At [facility], EKG NSR w/ no acute abnormality. Trop T 244 -> 211  - Drug screen neg however reports hx of ectasy and ketamine use (most recently 2 weeks ago)  - Afebrile, No white count, however pt reports recent fevers the past week undergoing tx for UTI  - RVP negative  - CCTA 3/25/24: Ca score 0, normal coronaries  - TTE 3/25/24: EF 60-65%; normal  - pending cMRI  - Given most likely myopericarditis dx, start colchicine 0.6mg BID and ibuprofen 600 mg TID (dec dose by 200 mg every 1-2 weeks)

## 2024-03-25 NOTE — DISCHARGE NOTE PROVIDER - PROVIDER TOKENS
PROVIDER:[TOKEN:[[id number]],FOLLOWUP:[2 weeks]] PROVIDER:[TOKEN:[[id number]],FOLLOWUP:[2 weeks]],FREE:[LAST:[[facility]],PHONE:[phone]],FAX:[(   )    -],ADDRESS:[[facility]  Telehealth visits  [address] [place] [address] [place] [address]  [phone]],FOLLOWUP:[2 weeks]],FREE:[LAST:[[facility]],PHONE:[phone]],FAX:[(   )    -],ADDRESS:[[facility]  [address] [place] [address]],FOLLOWUP:[2 weeks]],FREE:[LAST:[[facility] [place]],PHONE:[phone]],FAX:[(   )    -],ADDRESS:[[facility] [place]  -Walk in services, Monday – Friday: 7:30am – 1pm   -[address]   [place] [address]],FOLLOWUP:[2 weeks]] PROVIDER:[TOKEN:[[id number]],FOLLOWUP:[2 weeks]],FREE:[LAST:[[facility]],PHONE:[phone]],FAX:[(   )    -],ADDRESS:[[facility]  Telehealth visits  -[address] [place] [address] [place] [address]  [phone]],FOLLOWUP:[2 weeks]],FREE:[LAST:[[facility]],PHONE:[phone]],FAX:[(   )    -],ADDRESS:[[facility]  [address] [place] [address]],FOLLOWUP:[2 weeks]],FREE:[LAST:[[facility]],PHONE:[phone]],FAX:[(   )    -],ADDRESS:[[facility]  -Walk in services, Monday – Friday: 7:30am – 1pm   -[address]   [place] [address]],FOLLOWUP:[2 weeks]],FREE:[LAST:[[place]],FIRST:[Nirav Morrison],PHONE:[phone]],FAX:[(   )    -],ADDRESS:[[address] [place] [address]],FOLLOWUP:[2 weeks]]

## 2024-03-25 NOTE — PROGRESS NOTE ADULT - SUBJECTIVE AND OBJECTIVE BOX
Interventional Cardiology PA Adult Progress Note    Subjective Assessment: Seen and examined bedside. Denies chest pain, palpitations, SOB, orthopnea/PND, dizziness, LOC, n/v/d, fever/chills/sick contacts, LE edema. She states she is doing well and has no complaints    ROS negative except as noted above.  	  MEDICATIONS:  cefpodoxime 100 milliGRAM(s) Oral every 12 hours  ibuprofen  Tablet. 600 milliGRAM(s) Oral every 8 hours  colchicine 0.6 milliGRAM(s) Oral every 12 hours        	    PHYSICAL EXAM:  TELEMETRY:  T(C): 37 (03-25-24 @ 10:20), Max: 37 (03-25-24 @ 10:20)  HR: 58 (03-25-24 @ 12:47) (57 - 80)  BP: 115/54 (03-25-24 @ 12:47) (105/59 - 115/65)  RR: 16 (03-25-24 @ 12:47) (16 - 17)  SpO2: 98% (03-25-24 @ 12:47) (97% - 99%)  Wt(kg): --  I&O's Summary    24 Mar 2024 07:01  -  25 Mar 2024 07:00  --------------------------------------------------------  IN: 780 mL / OUT: 600 mL / NET: 180 mL    25 Mar 2024 07:01  -  25 Mar 2024 15:21  --------------------------------------------------------  IN: 180 mL / OUT: 0 mL / NET: 180 mL      Height (cm): 160 (03-24 @ 22:55)                                        Appearance: Normal	  HEENT:   Normal oral mucosa, PERRLA, EOMI	  Neck: Supple, + JVD/ - JVD; Carotid Bruit   Cardiovascular: Normal S1 S2, No JVD, No murmurs,   Respiratory: Lungs clear to auscultation/Decreased Breath Sounds/No Rales, Rhonchi, Wheezing	  Gastrointestinal:  Soft, Non-tender, + BS	  Skin: No rashes, No ecchymoses, No cyanosis  Extremities: Normal range of motion, No clubbing, cyanosis or edema  Vascular: Peripheral pulses palpable 2+ bilaterally  Neurologic: Non-focal  Psychiatry: A & O x 3, Mood & affect appropriate                          11.8   6.82  )-----------( 302      ( 25 Mar 2024 05:30 )             33.4     03-25    134<L>  |  102  |  14  ----------------------------<  97  4.1   |  25  |  0.55    Ca    9.7      25 Mar 2024 05:30  Mg     2.1     03-25    TPro  6.9  /  Alb  3.9  /  TBili  <0.2  /  DBili  x   /  AST  37  /  ALT  30  /  AlkPhos  86  03-24

## 2024-03-25 NOTE — DISCHARGE NOTE PROVIDER - HOSPITAL COURSE
INCOMPLETE    23 YOF, former smoker, family history of CAD (father with PCI), no significant PMHx, presents to [facility] c/o right sided chest pain w/ radiation to LUE and L neck and back associated with dyspnea starting at 830 am on 3/23/24. Reported chest pain worsened with movement and denied ever having sx like this in the past. Patient denied any strenuous exercies, recent trauma, recent travel, denies being on OCPs and no personal or family hx of DVT/VTE. Patient denies regular ETOH use, denied illicit drug use however reports being a former smoker (1/2 PPD x7 years quit 1 week ago). Reported visiting the ER two days ago for dysuria and diagnosed with UTI. She was sent home on a course of Cefpodoxime 100mg PO BID for 7 days. In the ER at [place], labs significant for HS Trop T 2241--> 4700, Sed Rate 50, , EKG NSR with TWI V2-V3, Ct abdomen likely positive for pyelonephritis. Drug screen and RVP negative. Bedside u/s without motion abnormalities, normal EF, no effusion per MD note. Myocarditis ruled in and patient is now transferred to [facility] for cardiac MRI and further management. Upon arrival to [facility], patient in stable condition, VSS and chest pain free.    IMAGING AT [place]:   -CXR: no evidence of acute pulmonary disease   -CT Chest, Abd, Pelvis with IVC: no evidence of dissection, wedge shape of the lucency in the upper pole of the L kidney suspicious for pyelonephritis or focal infarction clinical correlation recommended Evidence of constipation.     [facility] Hospital Course:  - CCTA 3/25/24: Ca score 0, normal coronaries  - TTE 3/25/24: EF 60-65%; normal  -cMRI: _______________    Pysch consulted as patient expressing feelings of hoplessness and depression for the last year. Denies suicidal ideation, but states she does not have any family in the [place]. Psych recs _______________    Discharge meds:    Pt is asymptomatic at this time and denies chest pain, SOB, KENT, palpitations, dizziness, LOC, N/V, diaphoresis, orthopnea/PND, and leg swelling. Pt able to ambulate and void without complication. VSS. Labs and telemetry reviewed. ___________ access site stable and dressing C/D/I.  Pt is a candidate for discharge per . ________. Pt given appropriate discharge instructions, pt states they have an appropriate amount of their previous home meds unchanged from this visit at home, and any new medications were sent to their pharmacy. Pt instructed to f/u with ________ in 1-2 weeks. 23 YOF, former smoker, family history of CAD (father with PCI), no significant PMHx, presents to [facility] c/o right sided chest pain w/ radiation to LUE and L neck and back associated with dyspnea starting at 830 am on 3/23/24. Reported chest pain worsened with movement and denied ever having sx like this in the past. Patient denied any strenuous exercies, recent trauma, recent travel, denies being on OCPs and no personal or family hx of DVT/VTE. Patient denies regular ETOH use, denied illicit drug use however reports being a former smoker (1/2 PPD x7 years quit 1 week ago). Reported visiting the ER two days ago for dysuria and diagnosed with UTI. She was sent home on a course of Cefpodoxime 100mg PO BID for 7 days. In the ER at [place], labs significant for HS Trop T 2241--> 4700, Sed Rate 50, , EKG NSR with TWI V2-V3, Ct abdomen likely positive for pyelonephritis. Drug screen and RVP negative. Bedside u/s without motion abnormalities, normal EF, no effusion per MD note. Myocarditis ruled in and patient is now transferred to [facility] for cardiac MRI and further management. Upon arrival to [facility], patient in stable condition, VSS and chest pain free.    IMAGING AT [place]:   -CXR: no evidence of acute pulmonary disease   -CT Chest, Abd, Pelvis with IVC: no evidence of dissection, wedge shape of the lucency in the upper pole of the L kidney suspicious for pyelonephritis or focal infarction clinical correlation recommended Evidence of constipation.     [facility] Hospital Course:  - CCTA 3/25/24: Ca score 0, normal coronaries  - TTE 3/25/24: EF 60-65%; normal  -cMRI 3/26/24 (prelim reading):  LV normal in size, LVEF 65%. RVEF 57%. No significant valve disease, trace pericardial effusion. There is subepicardial enhancement of the basal inferolateral with extension into the basal inferior and basal anterolateral wall segments. This is a non-ischemic pattern and may be seen in the setting of myocarditis.     [place] consulted as patient expressing feelings of hopelessness and depression for the last year. Denies suicidal ideation, but states she does not have any family in the [place]. Psych recs _______________    Pt is asymptomatic at this time and denies chest pain, SOB, KENT, palpitations, dizziness, LOC, N/V, diaphoresis, orthopnea/PND, and leg swelling. Pt able to ambulate and void without complication. VSS. Labs and telemetry reviewed. IV access site stable and dressing C/D/I.  Pt is a candidate for discharge per Dr. Ron. Pt given appropriate discharge instructions, pt states they have an appropriate amount of their previous home meds unchanged from this visit at home, and any new medications were sent to their pharmacy. Pt instructed to f/u with ________ in 1-2 weeks. 23 YOF, former smoker, family history of CAD (father with PCI), no significant PMHx, presents to [facility] c/o right sided chest pain w/ radiation to LUE and L neck and back associated with dyspnea starting at 830 am on 3/23/24. Reported chest pain worsened with movement and denied ever having sx like this in the past. Patient denied any strenuous exercies, recent trauma, recent travel, denies being on OCPs and no personal or family hx of DVT/VTE. Patient denies regular ETOH use, denied illicit drug use however reports being a former smoker (1/2 PPD x7 years quit 1 week ago). Reported visiting the ER two days ago for dysuria and diagnosed with UTI. She was sent home on a course of Cefpodoxime 100mg PO BID for 7 days. In the ER at [place], labs significant for HS Trop T 2241--> 4700, Sed Rate 50, , EKG NSR with TWI V2-V3, Ct abdomen likely positive for pyelonephritis. Drug screen and RVP negative. Bedside u/s without motion abnormalities, normal EF, no effusion per MD note. Myocarditis ruled in and patient is now transferred to [facility] for cardiac MRI and further management. Upon arrival to [facility], patient in stable condition, VSS and chest pain free.    IMAGING AT [place]:   -CXR: no evidence of acute pulmonary disease   -CT Chest, Abd, Pelvis with IVC: no evidence of dissection, wedge shape of the lucency in the upper pole of the L kidney suspicious for pyelonephritis or focal infarction clinical correlation recommended Evidence of constipation.     [facility] Hospital Course:  - CCTA 3/25/24: Ca score 0, normal coronaries  - TTE 3/25/24: EF 60-65%; normal  -cMRI 3/26/24 (prelim reading):  LV normal in size, LVEF 65%. RVEF 57%. No significant valve disease, trace pericardial effusion. There is subepicardial enhancement of the basal inferolateral with extension into the basal inferior and basal anterolateral wall segments. This is a non-ischemic pattern and may be seen in the setting of myocarditis.     Pt is asymptomatic at this time and denies chest pain, SOB, KENT, palpitations, dizziness, LOC, N/V, diaphoresis, orthopnea/PND, and leg swelling. Pt able to ambulate and void without complication. VSS. Labs and telemetry reviewed. IV access site stable and dressing C/D/I.  Pt is a candidate for discharge per Dr. Ron. Pt given appropriate discharge instructions, pt states they have an appropriate amount of their previous home meds unchanged from this visit at home, and any new medications were sent to their pharmacy. Pt instructed to f/u with Dr. Hylton in 1-2 weeks. 23 YOF, former smoker, family history of CAD (father with PCI), no significant PMHx, presents to [facility] c/o right sided chest pain w/ radiation to LUE and L neck and back associated with dyspnea starting at 830 am on 3/23/24. Reported chest pain worsened with movement and denied ever having sx like this in the past. Patient denied any strenuous exercies, recent trauma, recent travel, denies being on OCPs and no personal or family hx of DVT/VTE. Patient denies regular ETOH use, denied illicit drug use however reports being a former smoker (1/2 PPD x7 years quit 1 week ago). Reported visiting the ER two days ago for dysuria and diagnosed with UTI. She was sent home on a course of Cefpodoxime 100mg PO BID for 7 days. In the ER at [place], labs significant for HS Trop T 2241--> 4700, Sed Rate 50, , EKG NSR with TWI V2-V3, Ct abdomen likely positive for pyelonephritis. Drug screen and RVP negative. Bedside u/s without motion abnormalities, normal EF, no effusion per MD note. Myocarditis ruled in and patient is now transferred to [facility] for cardiac MRI and further management. Upon arrival to [facility], patient in stable condition, VSS and chest pain free.    IMAGING AT [place]:   -CXR: no evidence of acute pulmonary disease ..  -CT Chest, Abd, Pelvis with IVC: no evidence of dissection, wedge shape of the lucency in the upper pole of the L kidney suspicious for pyelonephritis or focal infarction clinical correlation recommended Evidence of constipation.     [facility] Hospital Course:  - CCTA 3/25/24: Ca score 0, normal coronaries  - TTE 3/25/24: EF 60-65%; normal  -cMRI 3/26/24 (prelim reading):  LV normal in size, LVEF 65%. RVEF 57%. No significant valve disease, trace pericardial effusion. There is subepicardial enhancement of the basal inferolateral with extension into the basal inferior and basal anterolateral wall segments. This is a non-ischemic pattern and may be seen in the setting of myocarditis.     Pt is asymptomatic at this time and denies chest pain, SOB, KENT, palpitations, dizziness, LOC, N/V, diaphoresis, orthopnea/PND, and leg swelling. Pt able to ambulate and void without complication. VSS. Labs and telemetry reviewed. IV access site stable and dressing C/D/I.  Pt is a candidate for discharge per Dr. Ron. Pt given appropriate discharge instructions, pt states they have an appropriate amount of their previous home meds unchanged from this visit at home, and any new medications were sent to their pharmacy. Pt instructed to f/u with Dr. Hylton in 1-2 weeks. 23 YOF, former smoker, family history of CAD (father with PCI), no significant PMHx, presents to [facility] c/o right sided chest pain w/ radiation to LUE and L neck and back associated with dyspnea starting at 830 am on 3/23/24. Reported chest pain worsened with movement and denied ever having sx like this in the past. Patient denied any strenuous exercies, recent trauma, recent travel, denies being on OCPs and no personal or family hx of DVT/VTE. Patient denies regular ETOH use, denied illicit drug use however reports being a former smoker (1/2 PPD x7 years quit 1 week ago). Reported visiting the ER two days ago for dysuria and diagnosed with UTI. She was sent home on a course of Cefpodoxime 100mg PO BID for 7 days. In the ER at [place], labs significant for HS Trop T 2241--> 4700, Sed Rate 50, , EKG NSR with TWI V2-V3, Ct abdomen likely positive for pyelonephritis. Drug screen and RVP negative. Bedside u/s without motion abnormalities, normal EF, no effusion per MD note. Myocarditis ruled in and patient is now transferred to [facility] for cardiac MRI and further management. Upon arrival to [facility], patient in stable condition, VSS and chest pain free.    IMAGING AT [place]:   -CXR: no evidence of acute pulmonary disease ..  -CT Chest, Abd, Pelvis with IVC: no evidence of dissection, wedge shape of the lucency in the upper pole of the L kidney suspicious for pyelonephritis or focal infarction clinical correlation recommended Evidence of constipation.     [facility] Hospital Course:  - CCTA 3/25/24: Ca score 0, normal coronaries  - TTE 3/25/24: EF 60-65%; normal  -cMRI 3/26/24 (prelim reading):  LV normal in size, LVEF 65%. RVEF 57%. No significant valve disease, trace pericardial effusion. There is subepicardial enhancement of the basal inferolateral with extension into the basal inferior and basal anterolateral wall segments. This is a non-ischemic pattern and may be seen in the setting of myocarditis.     Psych was also consulted inpatient for patient mentioning some hx of depression and feeling of hopelessness in absence of any suicidal ideation. Per psych eval, advised outpatient referrals at this juncture.     Pt is asymptomatic at this time and denies chest pain, SOB, KENT, palpitations, dizziness, LOC, N/V, diaphoresis, orthopnea/PND, and leg swelling. Pt able to ambulate and void without complication. VSS. Labs and telemetry reviewed. IV access site stable and dressing C/D/I.  Pt is a candidate for discharge per Dr. Ron. Pt given appropriate discharge instructions, pt states they have an appropriate amount of their previous home meds unchanged from this visit at home, and any new medications were sent to their pharmacy. Pt instructed to f/u with Dr. Hylton in 1-2 weeks. 23 YOF, former smoker, family history of CAD (father with PCI), no significant PMHx, presents to [facility] c/o right sided chest pain w/ radiation to LUE and L neck and back associated with dyspnea starting at 830 am on 3/23/24. Reported chest pain worsened with movement and denied ever having sx like this in the past. Patient denied any strenuous exercise, recent trauma, recent travel, denies being on OCPs and no personal or family hx of DVT/VTE. Patient denies regular ETOH use, denied illicit drug use however reports being a former smoker (1/2 PPD x7 years quit 1 week ago). Reported visiting the ER two days ago for dysuria and diagnosed with UTI. She was sent home on a course of Cefpodoxime 100mg PO BID for 7 days. In the ER at [place], labs significant for HS Trop T 2241--> 4700, Sed Rate 50, , EKG NSR with TWI V2-V3, Ct abdomen likely positive for pyelonephritis. Drug screen and RVP negative. Bedside u/s without motion abnormalities, normal EF, no effusion per MD note. Myopericarditis ruled in and patient is now transferred to [facility] for cardiac MRI and further management. Upon arrival to [facility], patient in stable condition, VSS and chest pain free.    IMAGING AT [place]:   -CXR: no evidence of acute pulmonary disease ..  -CT Chest, Abd, Pelvis with IVC: no evidence of dissection, wedge shape of the lucency in the upper pole of the L kidney suspicious for pyelonephritis or focal infarction clinical correlation recommended Evidence of constipation.     [facility] Hospital Course:  - CCTA 3/25/24: Ca score 0, normal coronaries  - TTE 3/25/24: EF 60-65%; normal  -cMRI 3/26/24 (prelim reading):  LV normal in size, LVEF 65%. RVEF 57%. No significant valve disease, trace pericardial effusion. There is subepicardial enhancement of the basal inferolateral with extension into the basal inferior and basal anterolateral wall segments. This is a non-ischemic pattern and may be seen in the setting of myocarditis.     Psych was also consulted inpatient for patient mentioning some hx of depression and feeling of hopelessness in absence of any suicidal ideation. Per psych eval, advised outpatient referrals at this juncture.     Pt is asymptomatic at this time and denies chest pain, SOB, KENT, palpitations, dizziness, LOC, N/V, diaphoresis, orthopnea/PND, and leg swelling. Pt able to ambulate and void without complication. VSS. Labs and telemetry reviewed. IV access site stable and dressing C/D/I.  Pt is a candidate for discharge per Dr. Ron. Pt given appropriate discharge instructions, pt states they have an appropriate amount of their previous home meds unchanged from this visit at home, and any new medications were sent to their pharmacy. Pt instructed to f/u with Dr. Hylton in 1-2 weeks. 23 YOF, former smoker, family history of CAD (father with PCI), no significant PMHx, presents to [facility] c/o right sided chest pain w/ radiation to LUE and L neck and back associated with dyspnea starting at 830 am on 3/23/24. Reported chest pain worsened with movement and denied ever having sx like this in the past. Patient denied any strenuous exercise, recent trauma, recent travel, denies being on OCPs and no personal or family hx of DVT/VTE. Patient denies regular ETOH use, denied illicit drug use however reports being a former smoker (1/2 PPD x7 years quit 1 week ago). Reported visiting the ER two days ago for dysuria and diagnosed with UTI. She was sent home on a course of Cefpodoxime 100mg PO BID for 7 days. In the ER at [place], labs significant for HS Trop T 2241--> 4700, Sed Rate 50, , EKG NSR with TWI V2-V3, Ct abdomen likely positive for pyelonephritis. Drug screen and RVP negative. Bedside u/s without motion abnormalities, normal EF, no effusion per MD note. Myopericarditis ruled in and patient is now transferred to [facility] for cardiac MRI and further management. Upon arrival to [facility], patient in stable condition, VSS and chest pain free.    IMAGING AT [place]:   -CXR: no evidence of acute pulmonary disease ..  -CT Chest, Abd, Pelvis with IVC: no evidence of dissection, wedge shape of the lucency in the upper pole of the L kidney suspicious for pyelonephritis or focal infarction clinical correlation recommended Evidence of constipation.     [facility] Hospital Course:  - CCTA 3/25/24: Ca score 0, normal coronaries  - TTE 3/25/24: EF 60-65%; normal  -cMRI 3/26/24 (prelim reading):  LV normal in size, LVEF 65%. RVEF 57%. No significant valve disease, trace pericardial effusion. There is subepicardial enhancement of the basal inferolateral with extension into the basal inferior and basal anterolateral wall segments. This is a non-ischemic pattern and may be seen in the setting of myocarditis.     Psych was also consulted inpatient for patient mentioning some hx of depression and feeling of hopelessness in absence of any suicidal ideation. Per psych eval, advised outpatient referrals at this juncture.     Pt is asymptomatic at this time and denies chest pain, SOB, KENT, palpitations, dizziness, LOC, N/V, diaphoresis, orthopnea/PND, and leg swelling. Pt able to ambulate and void without complication. VSS. Labs and telemetry reviewed. IV access site stable and dressing C/D/I.  Pt is a candidate for discharge per Dr. Ron. Pt given appropriate discharge instructions, pt states they have an appropriate amount of their previous home meds unchanged from this visit at home, and any new medications were sent to their pharmacy. Pt instructed to f/u with Dr. Hylton in 1-2 weeks. Patient also requested f/u with cardiologist at [facility] and was given info for Dr. Shaw.

## 2024-03-25 NOTE — PROGRESS NOTE ADULT - ASSESSMENT
22yo F, former smoker, FHx CAD (father MI age 51), no significant PMHx, presented to [facility] c/o right sided CP radiating to neck and back. Trops 2241 ->4700 with elevated ESR and CRP. TTE 3/25 normal, CCTA 3/25 normal. Pending cMRI.

## 2024-03-25 NOTE — DISCHARGE NOTE PROVIDER - CARE PROVIDERS DIRECT ADDRESSES
,[email or web address],DirectAddress_Unknown,DirectAddress_Unknown,DirectAddress_Unknown,DirectAddress_Unknown,DirectAddress_Unknown,DirectAddress_Unknown ,[email or web address],DirectAddress_Unknown,DirectAddress_Unknown,DirectAddress_Unknown,DirectAddress_Unknown

## 2024-03-25 NOTE — DISCHARGE NOTE PROVIDER - NPI NUMBER (FOR SYSADMIN USE ONLY) :
[[id number]] [[id number]],[UNKNOWN],[UNKNOWN],[UNKNOWN],[UNKNOWN],[UNKNOWN],[UNKNOWN] [[id number]],[UNKNOWN],[UNKNOWN],[UNKNOWN],[UNKNOWN]

## 2024-03-25 NOTE — DISCHARGE NOTE PROVIDER - NSDCMRMEDTOKEN_GEN_ALL_CORE_FT
cefpodoxime 100 mg oral tablet: 1 tab(s) orally 2 times a day   cefpodoxime 100 mg oral tablet: 1 tab(s) orally 2 times a day  colchicine 0.6 mg oral tablet: 1 tab(s) orally every 12 hours   cefpodoxime 100 mg oral tablet: 1 tab(s) orally 2 times a day  colchicine 0.6 mg oral tablet: 1 tab(s) orally every 12 hours  ibuprofen 200 mg oral tablet: 1 tab(s) orally 3 times a day  ibuprofen 400 mg oral tablet: 1 tab(s) orally 3 times a day  ibuprofen 600 mg oral tablet: 1 tab(s) orally every 8 hours Take Ibuprofen 600mg every 8 hours. Reduce dose by 200 every 2 weeks   cefpodoxime 100 mg oral tablet: 1 tab(s) orally 2 times a day  colchicine 0.6 mg oral tablet: 1 tab(s) orally every 12 hours  ibuprofen 200 mg oral tablet: 1 tab(s) orally 3 times a day Take Ibuprofen 200mg every 8 hours. End course on 5/9/24  ibuprofen 400 mg oral tablet: 1 tab(s) orally 3 times a day Take Ibuprofen 400mg every 8 hours. Reduce dose by 200 every 2 weeks  ibuprofen 600 mg oral tablet: 1 tab(s) orally every 8 hours Take Ibuprofen 600mg every 8 hours. Reduce dose by 200 every 2 weeks

## 2024-03-25 NOTE — DISCHARGE NOTE PROVIDER - CARE PROVIDER_API CALL
TEO CURRAN  [address] [place] [address] FLOOR [address]  [place] [address]  Phone: [phone]  Fax: [phone]  Follow Up Time: 2 weeks   TEO CURRAN  [address] [facility] 3RD FLOOR [address]  [place] [address]  Phone: [phone]  Fax: [phone]  Follow Up Time: 2 weeks    [facility],   [facility]  Telehealth visits  -[address], [place] [address]  [phone]  Phone: [phone]  Fax: (   )    -  Follow Up Time: 2 weeks    [facility],   [facility]  [address] 6th [place] [address]  Phone: [phone]  Fax: (   )    -  Follow Up Time: 2 weeks    [facility]  -Walk in services, Monday – Friday: 7:30am – 1pm   -[address]   [place] [address]  Phone: [phone]  Fax: (   )    -  Follow Up Time: 2 weeks    - *Virtual since Covid – Call first*,   [facility] - *Virtual since Covid – Call first*  -[address]   B-[facility] building, Ground Floor, [address]  [place] [address]  Phone: [phone]  Fax: (   )    -  Follow Up Time: 2 weeks    [facility],   [facility]  -[address], 9th Floor  [place], [place] [address]   [phone] (x119 for intakes)  Phone: [phone]  Fax: (   )    -  Follow Up Time: 2 weeks    [facility],   [facility]  -Three locations  ?	[address] [place] [address]  Phone: [phone]  ?	[address] [facility] [address] [place] [address]  Phone: [phone]  ?	[address] [facility] [address] [place] [address]  Phone: [phone]  Phone: (   )    -  Fax: (   )    -  Follow Up Time: 2 weeks   TEO CURRAN  [address] [facility] 3RD FLOOR [address]  [place] [address]  Phone: [phone]  Fax: [phone]  Follow Up Time: 2 weeks    [facility],   [facility]  Telehealth visits  -[address], [place] [address]  [phone]  Phone: [phone]  Fax: (   )    -  Follow Up Time: 2 weeks    [facility],   [facility]  [address] 6th [place] [address]  Phone: [phone]  Fax: (   )    -  Follow Up Time: 2 weeks    [facility] in services, Monday – Friday: 7:30am – 1pm   -[address]   [place] [address]  Phone: [phone]  Fax: (   )    -  Follow Up Time: 2 weeks    Nirav Shaw  [address] 1st floor, [place] [address]  Phone: [phone]  Fax: (   )    -  Follow Up Time: 2 weeks

## 2024-03-25 NOTE — PROGRESS NOTE ADULT - NS PANP COMMENT GEN_ALL_CORE FT
Patient off the floor during attending rounds  23F with substance use disorder (ecstasy/MDMA) and recent UTI  transferred from [facility] with request to perform cMRI as part of chest pain and dyspnea work up, c/f myopericarditis given pleurisy, elevated inflammatory markers and elevated hsTrop.  Plan  NPO for CCTA ischemic evaluation  TTE for cardiac structural evaluation  cMRI pending (anticipated 3/25 HS)  GDMT initiation pending LVEF on TTE  Cont empiric tx for pericarditis with Ibuprofen 600mg q8h and colchicine 0.6mg BID  Cont cefpodoxime for preexisting dx of UTI  Stacey Monae M.D.  Cardiology Attending

## 2024-03-25 NOTE — DISCHARGE NOTE PROVIDER - NSDCCPCAREPLAN_GEN_ALL_CORE_FT
PRINCIPAL DISCHARGE DIAGNOSIS  Diagnosis: Myocarditis  Assessment and Plan of Treatment: You presented with chest pain and elevated troponin. You underwent CCTA and was found to have normal coronaries. Your echo this admission was also satisfactory. You underwent cardiac MRI. You are found to have likely myocarditis. Please continue/start colchicine 0.6mg twice a day. Also please continue Ibuprofen 600mg three times a day and decrease dose by 200mg every 1-2 weeks. Please follow up with new cardiologist, _______, in 1-2 weeks. For any recurrent chest pain or worsening of symptoms, please call 911 and/or visit nearest ER.      SECONDARY DISCHARGE DIAGNOSES  Diagnosis: UTI (urinary tract infection)  Assessment and Plan of Treatment: Urinary tract infections, also called "UTIs," are infections that affect either the bladder or the kidneys. Bladder infections are more common than kidney infections. Bladder infections happen when bacteria get into the urethra and travel up into the bladder. You were diagnosed with UTI during your hospital admission and [facility] and was started to Cefpodoxime 500mg twice a day which you will continue to take until _________.   Signs that an infection has spread to the kidneys include fever, back pain, or nausea/vomiting. If you develop these symptoms please seek medical attention.      Diagnosis: Elevated LDL cholesterol level  Assessment and Plan of Treatment: You were found to have elevated . At this time, you are advised lifestyle modification and diet management along with close monitoring of elevated LDL with your PCP outpatient.     PRINCIPAL DISCHARGE DIAGNOSIS  Diagnosis: Myocarditis  Assessment and Plan of Treatment: You presented with chest pain and elevated troponin. You underwent CCTA and was found to have normal coronaries. Your echo this admission was also satisfactory. You underwent cardiac MRI. You are found to have likely myocarditis. Please continue/start colchicine 0.6mg twice a day for three months. Also please continue Ibuprofen 600mg three times a day and decrease dose by 200mg every 2 weeks. Please avoid any contact support for three months until your medication course is completed for myocarditis treatment. Please follow up with new cardiologist, Dr. Hylton, in 1-2 weeks. For any recurrent chest pain or worsening of symptoms, please call 911 and/or visit nearest ER.      SECONDARY DISCHARGE DIAGNOSES  Diagnosis: UTI (urinary tract infection)  Assessment and Plan of Treatment: Urinary tract infections, also called "UTIs," are infections that affect either the bladder or the kidneys. Bladder infections are more common than kidney infections. Bladder infections happen when bacteria get into the urethra and travel up into the bladder. You were diagnosed with UTI during your hospital admission and [place] and was started to Cefpodoxime 500mg twice a day which you will continue to take until 3/28/24.  Signs that an infection has spread to the kidneys include fever, back pain, or nausea/vomiting. If you develop these symptoms please seek medical attention.      Diagnosis: Elevated LDL cholesterol level  Assessment and Plan of Treatment: You were found to have elevated . At this time, you are advised lifestyle modification and diet management along with close monitoring of elevated LDL with your PCP outpatient.     PRINCIPAL DISCHARGE DIAGNOSIS  Diagnosis: Myocarditis  Assessment and Plan of Treatment: You presented with chest pain and elevated troponin. You underwent CCTA and was found to have normal coronaries. Your echo this admission was also satisfactory. You underwent cardiac MRI. You are found to have likely myocarditis. Please continue/start colchicine 0.6mg twice a day for three months. Also please continue Ibuprofen 600mg three times a day and decrease dose by 200mg every 2 weeks. Please avoid any contact support for three months until your medication course is completed for myocarditis treatment. Please follow up with new cardiologist, Dr. Hylton, in 1-2 weeks. For any recurrent chest pain or worsening of symptoms, please call 911 and/or visit nearest ER.      SECONDARY DISCHARGE DIAGNOSES  Diagnosis: UTI (urinary tract infection)  Assessment and Plan of Treatment: Urinary tract infections, also called "UTIs," are infections that affect either the bladder or the kidneys. Bladder infections are more common than kidney infections. Bladder infections happen when bacteria get into the urethra and travel up into the bladder. You were diagnosed with UTI during your hospital admission and [place] and was started to Cefpodoxime 500mg twice a day which you will continue to take until 3/28/24.  Signs that an infection has spread to the kidneys include fever, back pain, or nausea/vomiting. If you develop these symptoms please seek medical attention.      Diagnosis: Elevated LDL cholesterol level  Assessment and Plan of Treatment: You were found to have elevated . At this time, you are advised lifestyle modification and diet management along with close monitoring of elevated LDL with your PCP outpatient.    Diagnosis: Anxiety and depression  Assessment and Plan of Treatment: You were seen by psych inpatient as you requested. Please see the referrals information provided in the discharge note regarding outpatient locations for further management and care.     PRINCIPAL DISCHARGE DIAGNOSIS  Diagnosis: Acute myopericarditis  Assessment and Plan of Treatment: You presented with pleuritic chest pain, elevated inflammatory markers and elevated high sensitivity troponin, concerning for myopericarditis. You underwent CCTA and found to have normal coronaries. Your echocardiogram this admission was also satisfactory. You underwent cardiac MRI. You are found to have inflammatory changes consistent with likely myocarditis. Please continue/start colchicine 0.6mg twice a day for three months. Also please continue Ibuprofen 600mg three times a day and decrease dose by 200mg every 2 weeks. Please avoid competative sport participation for three months until your medication course is completed for myopericarditis treatment.  We recommend abstainance from alcohol or to restrict alcohol consumption to at most one alcoholic drink per day, since heavy alcohol intake may enhance the severity of the inflammation. Please follow up with [facility] cardiologist, Dr. Hylton, in 1-2 weeks.  You have also requested a [facility] cardiologist; please see referral for Dr Shaw. For any recurrent chest pain or worsening of symptoms, please call 911 and/or visit nearest ER.      SECONDARY DISCHARGE DIAGNOSES  Diagnosis: UTI (urinary tract infection)  Assessment and Plan of Treatment: Urinary tract infections, also called "UTIs," are infections that affect either the bladder or the kidneys. Bladder infections are more common than kidney infections. Bladder infections happen when bacteria get into the urethra and travel up into the bladder. You were diagnosed with UTI during your hospital admission and [place] and was started to Cefpodoxime 500mg twice a day which you will continue to take until 3/28/24.  Signs that an infection has spread to the kidneys include fever, back pain, or nausea/vomiting. If you develop these symptoms please seek medical attention.      Diagnosis: Elevated LDL cholesterol level  Assessment and Plan of Treatment: You were found to have elevated . At this time, you are advised lifestyle modification and diet management along with close monitoring of elevated LDL with your PCP outpatient.    Diagnosis: Anxiety and depression  Assessment and Plan of Treatment: You were seen by psych inpatient as you requested. Please see the referrals information provided in the discharge note regarding outpatient locations for further management and care.

## 2024-03-25 NOTE — DISCHARGE NOTE PROVIDER - NSDCFUADDINST_GEN_ALL_CORE_FT
We recommend a Mediterranean diet: Some suggestions include continue incorporating 2 or more servings per day of vegetables, fruits, and whole grains. Increase intake of fish and legumes/beans to 2 or more servings per week. Aim to increase intake of healthy fats, such as olive oil and avocados, and have a handful of nuts/seeds most days. Reduce red/processed meat consumption to 2 or fewer times per week   Activity restrictions: No contact support until course completed for myocarditis treatment with colchicine and ibuprofen.     We recommend a Mediterranean diet: Some suggestions include continue incorporating 2 or more servings per day of vegetables, fruits, and whole grains. Increase intake of fish and legumes/beans to 2 or more servings per week. Aim to increase intake of healthy fats, such as olive oil and avocados, and have a handful of nuts/seeds most days. Reduce red/processed meat consumption to 2 or fewer times per week

## 2024-03-26 ENCOUNTER — TRANSCRIPTION ENCOUNTER (OUTPATIENT)
Age: 24
End: 2024-03-26

## 2024-03-26 VITALS
DIASTOLIC BLOOD PRESSURE: 60 MMHG | OXYGEN SATURATION: 98 % | SYSTOLIC BLOOD PRESSURE: 119 MMHG | HEART RATE: 75 BPM | RESPIRATION RATE: 16 BRPM

## 2024-03-26 LAB
ANION GAP SERPL CALC-SCNC: 11 MMOL/L — SIGNIFICANT CHANGE UP (ref 5–17)
BUN SERPL-MCNC: 8 MG/DL — SIGNIFICANT CHANGE UP (ref 7–23)
CALCIUM SERPL-MCNC: 9.7 MG/DL — SIGNIFICANT CHANGE UP (ref 8.4–10.5)
CHLORIDE SERPL-SCNC: 100 MMOL/L — SIGNIFICANT CHANGE UP (ref 96–108)
CK MB BLD-MCNC: HIGH TITER
CO2 SERPL-SCNC: 24 MMOL/L — SIGNIFICANT CHANGE UP (ref 22–31)
COXSACKIE TYPE A-24: HIGH TITER
CREAT SERPL-MCNC: 0.57 MG/DL — SIGNIFICANT CHANGE UP (ref 0.5–1.3)
CV A24 IGG TITR SER IF: HIGH TITER
CV A7 AB SER-ACNC: HIGH TITER
CV A9 AB TITR FLD: HIGH TITER
EGFR: 133 ML/MIN/1.73M2 — SIGNIFICANT CHANGE UP
GLUCOSE SERPL-MCNC: 89 MG/DL — SIGNIFICANT CHANGE UP (ref 70–99)
HCT VFR BLD CALC: 36.8 % — SIGNIFICANT CHANGE UP (ref 34.5–45)
HGB BLD-MCNC: 12.6 G/DL — SIGNIFICANT CHANGE UP (ref 11.5–15.5)
MAGNESIUM SERPL-MCNC: 2.2 MG/DL — SIGNIFICANT CHANGE UP (ref 1.6–2.6)
MCHC RBC-ENTMCNC: 31.5 PG — SIGNIFICANT CHANGE UP (ref 27–34)
MCHC RBC-ENTMCNC: 34.2 GM/DL — SIGNIFICANT CHANGE UP (ref 32–36)
MCV RBC AUTO: 92 FL — SIGNIFICANT CHANGE UP (ref 80–100)
NRBC # BLD: 0 /100 WBCS — SIGNIFICANT CHANGE UP (ref 0–0)
PLATELET # BLD AUTO: 361 K/UL — SIGNIFICANT CHANGE UP (ref 150–400)
POTASSIUM SERPL-MCNC: 4.3 MMOL/L — SIGNIFICANT CHANGE UP (ref 3.5–5.3)
POTASSIUM SERPL-SCNC: 4.3 MMOL/L — SIGNIFICANT CHANGE UP (ref 3.5–5.3)
RBC # BLD: 4 M/UL — SIGNIFICANT CHANGE UP (ref 3.8–5.2)
RBC # FLD: 11.8 % — SIGNIFICANT CHANGE UP (ref 10.3–14.5)
SODIUM SERPL-SCNC: 135 MMOL/L — SIGNIFICANT CHANGE UP (ref 135–145)
WBC # BLD: 6.34 K/UL — SIGNIFICANT CHANGE UP (ref 3.8–10.5)
WBC # FLD AUTO: 6.34 K/UL — SIGNIFICANT CHANGE UP (ref 3.8–10.5)

## 2024-03-26 PROCEDURE — 99239 HOSP IP/OBS DSCHRG MGMT >30: CPT

## 2024-03-26 PROCEDURE — 82550 ASSAY OF CK (CPK): CPT

## 2024-03-26 PROCEDURE — 83036 HEMOGLOBIN GLYCOSYLATED A1C: CPT

## 2024-03-26 PROCEDURE — 86140 C-REACTIVE PROTEIN: CPT

## 2024-03-26 PROCEDURE — 93321 DOPPLER ECHO F-UP/LMTD STD: CPT

## 2024-03-26 PROCEDURE — 86658 ENTEROVIRUS ANTIBODY: CPT

## 2024-03-26 PROCEDURE — 86038 ANTINUCLEAR ANTIBODIES: CPT

## 2024-03-26 PROCEDURE — 99222 1ST HOSP IP/OBS MODERATE 55: CPT

## 2024-03-26 PROCEDURE — 93005 ELECTROCARDIOGRAM TRACING: CPT

## 2024-03-26 PROCEDURE — 85025 COMPLETE CBC W/AUTO DIFF WBC: CPT

## 2024-03-26 PROCEDURE — 85652 RBC SED RATE AUTOMATED: CPT

## 2024-03-26 PROCEDURE — A9577: CPT

## 2024-03-26 PROCEDURE — 0225U NFCT DS DNA&RNA 21 SARSCOV2: CPT

## 2024-03-26 PROCEDURE — 83880 ASSAY OF NATRIURETIC PEPTIDE: CPT

## 2024-03-26 PROCEDURE — 84484 ASSAY OF TROPONIN QUANT: CPT

## 2024-03-26 PROCEDURE — 82553 CREATINE MB FRACTION: CPT

## 2024-03-26 PROCEDURE — 75561 CARDIAC MRI FOR MORPH W/DYE: CPT

## 2024-03-26 PROCEDURE — 75574 CT ANGIO HRT W/3D IMAGE: CPT | Mod: MC

## 2024-03-26 PROCEDURE — 80061 LIPID PANEL: CPT

## 2024-03-26 PROCEDURE — 36415 COLL VENOUS BLD VENIPUNCTURE: CPT

## 2024-03-26 PROCEDURE — 84702 CHORIONIC GONADOTROPIN TEST: CPT

## 2024-03-26 PROCEDURE — 83735 ASSAY OF MAGNESIUM: CPT

## 2024-03-26 PROCEDURE — 87799 DETECT AGENT NOS DNA QUANT: CPT

## 2024-03-26 PROCEDURE — 93306 TTE W/DOPPLER COMPLETE: CPT

## 2024-03-26 PROCEDURE — 85027 COMPLETE CBC AUTOMATED: CPT

## 2024-03-26 PROCEDURE — 81001 URINALYSIS AUTO W/SCOPE: CPT

## 2024-03-26 PROCEDURE — 80053 COMPREHEN METABOLIC PANEL: CPT

## 2024-03-26 PROCEDURE — 80048 BASIC METABOLIC PNL TOTAL CA: CPT

## 2024-03-26 RX ORDER — COLCHICINE 0.6 MG
1 TABLET ORAL
Qty: 180 | Refills: 0
Start: 2024-03-26 | End: 2024-06-23

## 2024-03-26 RX ORDER — CEFPODOXIME PROXETIL 100 MG
1 TABLET ORAL
Qty: 4 | Refills: 0
Start: 2024-03-26 | End: 2024-03-27

## 2024-03-26 RX ORDER — COLCHICINE 0.6 MG
1 TABLET ORAL
Qty: 0 | Refills: 0 | DISCHARGE
Start: 2024-03-26

## 2024-03-26 RX ORDER — IBUPROFEN 200 MG
1 TABLET ORAL
Qty: 42 | Refills: 0
Start: 2024-03-26 | End: 2024-04-08

## 2024-03-26 RX ORDER — CEFPODOXIME PROXETIL 100 MG
1 TABLET ORAL
Refills: 0 | DISCHARGE

## 2024-03-26 RX ADMIN — Medication 600 MILLIGRAM(S): at 06:01

## 2024-03-26 RX ADMIN — Medication 0.6 MILLIGRAM(S): at 06:02

## 2024-03-26 RX ADMIN — Medication 100 MILLIGRAM(S): at 16:45

## 2024-03-26 RX ADMIN — Medication 0.6 MILLIGRAM(S): at 16:45

## 2024-03-26 RX ADMIN — Medication 600 MILLIGRAM(S): at 14:27

## 2024-03-26 RX ADMIN — Medication 600 MILLIGRAM(S): at 06:04

## 2024-03-26 RX ADMIN — Medication 100 MILLIGRAM(S): at 06:01

## 2024-03-26 NOTE — BH CONSULTATION LIAISON ASSESSMENT NOTE - CURRENT MEDICATION
MEDICATIONS  (STANDING):  cefpodoxime 100 milliGRAM(s) Oral every 12 hours  colchicine 0.6 milliGRAM(s) Oral every 12 hours  ibuprofen  Tablet. 600 milliGRAM(s) Oral every 8 hours    MEDICATIONS  (PRN):

## 2024-03-26 NOTE — DISCHARGE NOTE NURSING/CASE MANAGEMENT/SOCIAL WORK - PATIENT PORTAL LINK FT
You can access the FollowMyHealth Patient Portal offered by [facility] by registering at the following website: [email or web address]. By joining [employer]’s FollowMyHealth portal, you will also be able to view your health information using other applications (apps) compatible with our system.

## 2024-03-26 NOTE — PROGRESS NOTE ADULT - PROBLEM SELECTOR PLAN 2
endorses feelings of hopelessness / depression in past year  - pt has no family in [facility], denies suicidal ideation  - discussed with patient, agreed to psych consult  - f/u psych recs

## 2024-03-26 NOTE — PROVIDER CONTACT NOTE (OTHER) - BACKGROUND
Patient admitted for chest pain and elevated troponins.
Patient admitted for CP and elevated Troponins.

## 2024-03-26 NOTE — PROGRESS NOTE ADULT - SUBJECTIVE AND OBJECTIVE BOX
Cardiology PA Adult Progress Note    SUBJECTIVE ASSESSMENT:  	  MEDICATIONS:    cefpodoxime 100 milliGRAM(s) Oral every 12 hours      ibuprofen  Tablet. 600 milliGRAM(s) Oral every 8 hours      colchicine 0.6 milliGRAM(s) Oral every 12 hours      	  VITAL SIGNS:  T(C): 36.7 (03-26-24 @ 06:02), Max: 37 (03-25-24 @ 10:20)  HR: 58 (03-26-24 @ 06:02) (51 - 66)  BP: 104/50 (03-26-24 @ 06:02) (99/52 - 115/65)  RR: 16 (03-26-24 @ 06:02) (16 - 17)  SpO2: 98% (03-26-24 @ 06:02) (96% - 99%)  Wt(kg): --    I&O's Summary    25 Mar 2024 07:01  -  26 Mar 2024 07:00  --------------------------------------------------------  IN: 680 mL / OUT: 1 mL / NET: 679 mL                                           PHYSICAL EXAM:  Appearance: Normal	  HEENT: Normal oral mucosa, PERRL, EOMI	  Neck: Supple, + JVD/ - JVD; ___ Carotid Bruit   Cardiovascular: Normal S1 S2, No murmurs  Respiratory: Lungs clear to auscultation/Decreased Breath Sounds/No Rales, Rhonchi, Wheezing	  Gastrointestinal:  Soft, Non-tender, + BS	  Skin: No rashes, No ecchymoses, No cyanosis  Extremities: Normal range of motion, No clubbing, cyanosis or edema  Vascular: Peripheral pulses palpable 2+ bilaterally  Neurologic: Non-focal  Psychiatry: A & O x 3, Mood & affect appropriate    LABS:	 	                                  12.6   6.34  )-----------( 361      ( 26 Mar 2024 05:30 )             36.8     03-26    135  |  100  |  8   ----------------------------<  89  4.3   |  24  |  0.57    Ca    9.7      26 Mar 2024 05:30  Mg     2.2     03-26

## 2024-03-26 NOTE — BH CONSULTATION LIAISON ASSESSMENT NOTE - NSBHCONSULTFOLLOWAFTERCARE_PSY_A_CORE FT
•	[facility]  o	Telehealth visits  o	[address], [place] [address]  o	[phone]  o	Medicare, Medicaid, most private insurances  •	[facility]  o	[email or web address]  o	[address] 6th [place] [address]   [phone]  o	Medicare, Medicaid, most private insurances  •	[facility] [place]  o	Walk in services, Monday – Friday: 7:30am – 1pm   o	[address]   [place] [address]  o	[phone]  o	Medicare, Medicaid, and all private insurances  •	[facility] - *Virtual since Covid – Call first*  o	[address]   B-[facility] building, Ground Floor,  [address]  [place] [address]  o	[phone]  •	[facility]  o	[email or web address]  o	Three locations  ?	[address] [place] [address]  Phone: [phone]  ?	[address] [facility] [address] [place] [address]  Phone: [phone]  ?	[address] [facility] [address] [place] [address]  Phone: [phone]  o	Medicare, Medicaid, most private insurance and sliding scale  •	[facility]  o	[email or web address]  o	[address], 9th Floor  [place], [place] [address]   [phone] (x119 for intakes)

## 2024-03-26 NOTE — BH CONSULTATION LIAISON ASSESSMENT NOTE - HPI (INCLUDE ILLNESS QUALITY, SEVERITY, DURATION, TIMING, CONTEXT, MODIFYING FACTORS, ASSOCIATED SIGNS AND SYMPTOMS)
23F from [place] with a history of MDMA and ketamine use, recent UTI, transferred from [place] for cardiac MRI after presenting with chest pain suspicious for myocarditis. Echo and CCTA have been normal now just pending MRI. Consult is for evaluation and management of depression, including sx of hopelessness and social isolation (family in [place]). Pt was admitted to the hospital due to concerns of a heart attack, experiencing pronounced chest pain. She described rapid and difficult-to-control emotional shifts, including crying, anger outbursts, and behaviors such as squeezing herself, yelling, and punching her pillow. History reveals a longstanding pattern of self-harm, dating back to childhood, including episodes of breaking objects and scratching herself as punishment. Pt disclosed past struggles with suicidal thoughts during adolescence. She also disclosed an ongoing eating disorder, utilizing binge eating as a form of self-punishment, leading to significant weight gain. Psychiatric history includes a diagnosis of Borderline Personality Disorder (BPD) in high school, for which she was prescribed Abilify but discontinued due to side effects such as numbing and sleep disruptions. She expressed dissatisfaction with past psychological treatment, citing a lack of validation and lingering doubts about her well-being and normalcy. Immigrating from [place] last July, she faces pressure to succeed as a young immigrant, balancing work in a kitchen, English classes, and social obligations while pursuing her dreams of acting. Pt currently lives in an apartment in [place] with two roommates. Pt has a history of alcohol and drug use, including recent nicotine cessation and eight months of sobriety from various substances. She acknowledged the role of drugs in providing temporary happiness, support, and connection, but ceased usage to avoid addiction. Drug history includes prior use of weed, ecstasy, oxycodone, and LSD in [place] leading up to her immigration last July. She currently drinks 1-2 glasses of alcohol weekly or biweekly, though reported a hx of alcohol addiction in high school. Pt denies significant trauma history.

## 2024-03-26 NOTE — BH CONSULTATION LIAISON ASSESSMENT NOTE - NSBHCHARTREVIEWVS_PSY_A_CORE FT
Vital Signs Last 24 Hrs  T(C): 37 (26 Mar 2024 08:25), Max: 37 (26 Mar 2024 08:25)  T(F): 98.6 (26 Mar 2024 08:25), Max: 98.6 (26 Mar 2024 08:25)  HR: 75 (26 Mar 2024 12:02) (51 - 75)  BP: 119/60 (26 Mar 2024 12:02) (99/52 - 119/60)  BP(mean): 83 (26 Mar 2024 12:02) (78 - 83)  RR: 16 (26 Mar 2024 12:02) (16 - 16)  SpO2: 98% (26 Mar 2024 12:02) (96% - 98%)    Parameters below as of 26 Mar 2024 12:02  Patient On (Oxygen Delivery Method): room air

## 2024-03-26 NOTE — BH CONSULTATION LIAISON ASSESSMENT NOTE - NSBHATTESTCOMMENTATTENDFT_PSY_A_CORE
22 yo F, fam hx CAD, presented with right sided chest pain and dyspnea; troponin found elevated significantly in [place] ED; pt transferred to [facility] for cardiac MRI and myocarditis workup. Psychiatry consulted due to pt reporting depression. On interview pt reports hx of borderline personality disorder with previous abilify trial in high school. She is currently living in the [place] since last summer from [place]; hopes to enroll in college here. She is currently taking English language classes. She currently endorses desire to not go home because she has been dealing with multiple stressors outside the hospital. She denies SI/HI/AH/VH/aquiles/psychosis. Remains ambivalent about starting medications as she became sedated on abilify. Discussed recommending outpatient psychiatric follow up for continued diagnostic eval and continued evaluation before starting medications - pt reports agreement with plan.  24 yo F, fam hx CAD, presented with right sided chest pain and dyspnea; troponin found elevated significantly in [place] ED; pt transferred to [facility] for cardiac MRI and myocarditis workup. Psychiatry consulted due to pt reporting depression. On interview pt reports hx of borderline personality disorder with previous abilify trial in high school. [place] from [place]; hopes to enroll in college here. She is currently taking English language classes. She currently endorses desire to not go home because she has been dealing with multiple stressors outside the hospital. She denies SI/HI/AH/VH/aquiles/psychosis. Remains ambivalent about starting medications as she became sedated on abilify. Discussed recommending outpatient psychiatric follow up for continued diagnostic eval and continued evaluation before starting medications - pt reports agreement with plan. No indication for psychiatric hospitalization as pt is at low acute risk of harm to self or others and is able to safety plan and is future oriented.

## 2024-03-26 NOTE — PROGRESS NOTE ADULT - PROBLEM SELECTOR PLAN 3
diagnosed with UTI 3/21 and Rx'd Cefpodoxime 100mg PO BID   -CT A/P at [facility] reporting L pyelonephritis-- pt afebrile, no white count, dysuria has improved, non toxic appearing   -UA with trace leuk esterase   -c/w Cefpodoxime 100mg PO BID to complete course      #ELEVATED LDL   -  at [facility] and given 1 dose of Atorvastatin 80mg   - Discussed importance of lifestyle modifications w/ pt including diet and exercise  - Will hold off on medical therapy for now    F: None  E: Replete if K<4 or Mag<2  N: Regular diet (vegetarian)   GIppx: none   VTEppx: none patient ambulatory and VTE low risk    Dispo: pending cMRI
diagnosed with UTI 3/21 and Rx'd Cefpodoxime 100mg PO BID   -CT A/P at [facility] reporting L pyelonephritis-- pt afebrile, no white count, dysuria has improved, non toxic appearing   -UA with trace leuk esterase   -c/w Cefpodoxime 100mg PO BID to complete course      #ELEVATED LDL   -  at [facility] and given 1 dose of Atorvastatin 80mg   - Discussed importance of lifestyle modifications w/ pt including diet and exercise  - Will hold off on medical therapy for now    F: None  E: Replete if K<4 or Mag<2  N: Regular diet (vegetarian)   GIppx: none   VTEppx: none patient ambulatory and VTE low risk    Dispo: pending cMRI

## 2024-03-26 NOTE — PROGRESS NOTE ADULT - PROBLEM SELECTOR PLAN 1
Trop I 2241--> 4700, CRP 59.1, ESR 80,  EKG w/ TWI leads V2-V3 at [facility]   - At [facility], EKG NSR w/ no acute abnormality. Trop T 244 -> 211  - Drug screen neg however reports hx of ectasy and ketamine use (most recently 2 weeks ago)  - Afebrile, No white count, however pt reports recent fevers the past week undergoing tx for UTI  - RVP negative  - CCTA 3/25/24: Ca score 0, normal coronaries  - TTE 3/25/24: EF 60-65%; normal  - pending cMRI  - Given most likely myopericarditis dx, start colchicine 0.6mg BID and ibuprofen 600 mg TID (dec dose by 200 mg every 1-2 weeks)

## 2024-03-26 NOTE — BH CONSULTATION LIAISON ASSESSMENT NOTE - RISK ASSESSMENT
Pt is at low risk for suicidality as she denies current or recent SI. Risk factors for suicide include a borderline personality disorder and eating disorder diagnosis, no current engagement in psychiatric treatment, and social isolation from loved ones in [place]. Protective factors include stable housing and employment, future-oriented goals, and a solid support network of friends in the [place].

## 2024-03-26 NOTE — BH CONSULTATION LIAISON ASSESSMENT NOTE - SUMMARY
Pt has increased anxiety/panic symptoms in context of psychosocial stressors. Lifetime hx of borderline personality disorder  Pt is recommended for individual psychotherapy and psychotropic medication. Pt has increased anxiety/panic symptoms in context of psychosocial stressors. Lifetime hx of borderline personality disorder  Pt is recommended for individual psychotherapy and psychotropic medication.      PLAN:  - Psychiatrically cleared for discharge  - No indications for starting med at this time  - See referral resources below Pt was cooperative t/o interview. Despite reporting good sleep and feeling physically well, she noted a decreased appetite and fluctuating mood. She is A&Ox3 and denies current SI/SIB/HI/AH/VH. Pt has increased anxiety/panic symptoms in context of psychosocial stressors, most notably her recent immigration to the [place]. Lifetime hx of borderline personality disorder that is currently untreated. Pt is recommended for individual psychotherapy to address emotion dysregulation, interpersonal difficulties, and adjustment disorder. Pt is open to receiving psychotherapy and has received referrals for further treatment.      PLAN:  - Psychiatrically cleared for discharge  - No indications for starting med at this time  - See referral resources below

## 2024-03-26 NOTE — PROGRESS NOTE ADULT - ASSESSMENT
22yo F, former smoker, FHx CAD (father MI age 51), no significant PMHx, presented to [facility] c/o right sided CP radiating to neck and back. Trops 2241 ->4700 with elevated ESR and CRP. TTE 3/25 normal, CCTA 3/25 normal. Pending cMRI.

## 2024-03-27 LAB
ANA TITR SER: NEGATIVE — SIGNIFICANT CHANGE UP
B19V DNA FLD QL NAA+PROBE: SIGNIFICANT CHANGE UP IU/ML
CV B1 AB TITR FLD: HIGH
CV B2 AB TITR FLD: HIGH
CV B3 AB TITR FLD: HIGH
CV B4 AB TITR FLD: HIGH
CV B5 AB TITR FLD: HIGH
CV B6 AB TITR FLD: HIGH

## 2024-03-28 DIAGNOSIS — I51.4 MYOCARDITIS, UNSPECIFIED: ICD-10-CM

## 2024-03-28 DIAGNOSIS — E78.00 PURE HYPERCHOLESTEROLEMIA, UNSPECIFIED: ICD-10-CM

## 2024-03-28 DIAGNOSIS — Z82.49 FAMILY HISTORY OF ISCHEMIC HEART DISEASE AND OTHER DISEASES OF THE CIRCULATORY SYSTEM: ICD-10-CM

## 2024-03-28 DIAGNOSIS — Z87.891 PERSONAL HISTORY OF NICOTINE DEPENDENCE: ICD-10-CM

## 2024-03-28 DIAGNOSIS — I30.9 ACUTE PERICARDITIS, UNSPECIFIED: ICD-10-CM

## 2024-03-28 DIAGNOSIS — F41.8 OTHER SPECIFIED ANXIETY DISORDERS: ICD-10-CM

## 2024-03-28 DIAGNOSIS — N39.0 URINARY TRACT INFECTION, SITE NOT SPECIFIED: ICD-10-CM

## 2024-04-10 RX ORDER — IBUPROFEN 200 MG
1 TABLET ORAL
Qty: 42 | Refills: 0
Start: 2024-04-10 | End: 2024-04-23

## 2024-04-23 ENCOUNTER — EMERGENCY (EMERGENCY)
Facility: HOSPITAL | Age: 24
LOS: 1 days | Discharge: HOME | End: 2024-04-23
Attending: EMERGENCY MEDICINE | Admitting: EMERGENCY MEDICINE
Payer: MEDICAID

## 2024-04-23 VITALS
SYSTOLIC BLOOD PRESSURE: 107 MMHG | TEMPERATURE: 102 F | HEART RATE: 98 BPM | RESPIRATION RATE: 17 BRPM | OXYGEN SATURATION: 98 % | DIASTOLIC BLOOD PRESSURE: 59 MMHG

## 2024-04-23 VITALS
DIASTOLIC BLOOD PRESSURE: 72 MMHG | TEMPERATURE: 101 F | HEIGHT: 63 IN | OXYGEN SATURATION: 100 % | RESPIRATION RATE: 20 BRPM | WEIGHT: 139.99 LBS | SYSTOLIC BLOOD PRESSURE: 129 MMHG | HEART RATE: 105 BPM

## 2024-04-23 LAB
ANION GAP SERPL CALC-SCNC: 14 MMOL/L — SIGNIFICANT CHANGE UP (ref 5–17)
BASOPHILS # BLD AUTO: 0.02 K/UL — SIGNIFICANT CHANGE UP (ref 0–0.2)
BASOPHILS NFR BLD AUTO: 0.1 % — SIGNIFICANT CHANGE UP (ref 0–2)
BUN SERPL-MCNC: 6 MG/DL — LOW (ref 7–23)
CALCIUM SERPL-MCNC: 9.7 MG/DL — SIGNIFICANT CHANGE UP (ref 8.4–10.5)
CHLORIDE SERPL-SCNC: 101 MMOL/L — SIGNIFICANT CHANGE UP (ref 96–108)
CO2 SERPL-SCNC: 22 MMOL/L — SIGNIFICANT CHANGE UP (ref 22–31)
CREAT SERPL-MCNC: 0.65 MG/DL — SIGNIFICANT CHANGE UP (ref 0.5–1.3)
EGFR: 128 ML/MIN/1.73M2 — SIGNIFICANT CHANGE UP
EOSINOPHIL # BLD AUTO: 0 K/UL — SIGNIFICANT CHANGE UP (ref 0–0.5)
EOSINOPHIL NFR BLD AUTO: 0 % — SIGNIFICANT CHANGE UP (ref 0–6)
GLUCOSE SERPL-MCNC: 106 MG/DL — HIGH (ref 70–99)
HCG SERPL-ACNC: <1 MIU/ML — SIGNIFICANT CHANGE UP
HCT VFR BLD CALC: 35.5 % — SIGNIFICANT CHANGE UP (ref 34.5–45)
HGB BLD-MCNC: 12.3 G/DL — SIGNIFICANT CHANGE UP (ref 11.5–15.5)
IMM GRANULOCYTES NFR BLD AUTO: 0.3 % — SIGNIFICANT CHANGE UP (ref 0–0.9)
LACTATE SERPL-SCNC: 1 MMOL/L — SIGNIFICANT CHANGE UP (ref 0.5–2)
LYMPHOCYTES # BLD AUTO: 1.8 K/UL — SIGNIFICANT CHANGE UP (ref 1–3.3)
LYMPHOCYTES # BLD AUTO: 10.5 % — LOW (ref 13–44)
MCHC RBC-ENTMCNC: 31.9 PG — SIGNIFICANT CHANGE UP (ref 27–34)
MCHC RBC-ENTMCNC: 34.6 GM/DL — SIGNIFICANT CHANGE UP (ref 32–36)
MCV RBC AUTO: 92 FL — SIGNIFICANT CHANGE UP (ref 80–100)
MONOCYTES # BLD AUTO: 1.25 K/UL — HIGH (ref 0–0.9)
MONOCYTES NFR BLD AUTO: 7.3 % — SIGNIFICANT CHANGE UP (ref 2–14)
NEUTROPHILS # BLD AUTO: 14.05 K/UL — HIGH (ref 1.8–7.4)
NEUTROPHILS NFR BLD AUTO: 81.8 % — HIGH (ref 43–77)
NRBC # BLD: 0 /100 WBCS — SIGNIFICANT CHANGE UP (ref 0–0)
PLATELET # BLD AUTO: 198 K/UL — SIGNIFICANT CHANGE UP (ref 150–400)
POTASSIUM SERPL-MCNC: 4.6 MMOL/L — SIGNIFICANT CHANGE UP (ref 3.5–5.3)
POTASSIUM SERPL-SCNC: 4.6 MMOL/L — SIGNIFICANT CHANGE UP (ref 3.5–5.3)
RBC # BLD: 3.86 M/UL — SIGNIFICANT CHANGE UP (ref 3.8–5.2)
RBC # FLD: 12.3 % — SIGNIFICANT CHANGE UP (ref 10.3–14.5)
S PYO AG SPEC QL IA: NEGATIVE — SIGNIFICANT CHANGE UP
SODIUM SERPL-SCNC: 137 MMOL/L — SIGNIFICANT CHANGE UP (ref 135–145)
TROPONIN T, HIGH SENSITIVITY RESULT: <6 NG/L — SIGNIFICANT CHANGE UP (ref 0–51)
WBC # BLD: 17.18 K/UL — HIGH (ref 3.8–10.5)
WBC # FLD AUTO: 17.18 K/UL — HIGH (ref 3.8–10.5)

## 2024-04-23 PROCEDURE — 99285 EMERGENCY DEPT VISIT HI MDM: CPT

## 2024-04-23 PROCEDURE — 84702 CHORIONIC GONADOTROPIN TEST: CPT

## 2024-04-23 PROCEDURE — 36415 COLL VENOUS BLD VENIPUNCTURE: CPT

## 2024-04-23 PROCEDURE — 80048 BASIC METABOLIC PNL TOTAL CA: CPT

## 2024-04-23 PROCEDURE — 96374 THER/PROPH/DIAG INJ IV PUSH: CPT

## 2024-04-23 PROCEDURE — 84484 ASSAY OF TROPONIN QUANT: CPT

## 2024-04-23 PROCEDURE — 93010 ELECTROCARDIOGRAM REPORT: CPT

## 2024-04-23 PROCEDURE — 83605 ASSAY OF LACTIC ACID: CPT

## 2024-04-23 PROCEDURE — 99285 EMERGENCY DEPT VISIT HI MDM: CPT | Mod: 25

## 2024-04-23 PROCEDURE — 93005 ELECTROCARDIOGRAM TRACING: CPT

## 2024-04-23 PROCEDURE — 87040 BLOOD CULTURE FOR BACTERIA: CPT

## 2024-04-23 PROCEDURE — 71045 X-RAY EXAM CHEST 1 VIEW: CPT

## 2024-04-23 PROCEDURE — 87081 CULTURE SCREEN ONLY: CPT

## 2024-04-23 PROCEDURE — 96375 TX/PRO/DX INJ NEW DRUG ADDON: CPT

## 2024-04-23 PROCEDURE — 85025 COMPLETE CBC W/AUTO DIFF WBC: CPT

## 2024-04-23 PROCEDURE — 71045 X-RAY EXAM CHEST 1 VIEW: CPT | Mod: 26

## 2024-04-23 PROCEDURE — 87880 STREP A ASSAY W/OPTIC: CPT

## 2024-04-23 RX ORDER — KETOROLAC TROMETHAMINE 30 MG/ML
15 SYRINGE (ML) INJECTION ONCE
Refills: 0 | Status: DISCONTINUED | OUTPATIENT
Start: 2024-04-23 | End: 2024-04-23

## 2024-04-23 RX ORDER — SODIUM CHLORIDE 9 MG/ML
1000 INJECTION INTRAMUSCULAR; INTRAVENOUS; SUBCUTANEOUS ONCE
Refills: 0 | Status: COMPLETED | OUTPATIENT
Start: 2024-04-23 | End: 2024-04-23

## 2024-04-23 RX ORDER — ACETAMINOPHEN 500 MG
1000 TABLET ORAL ONCE
Refills: 0 | Status: COMPLETED | OUTPATIENT
Start: 2024-04-23 | End: 2024-04-23

## 2024-04-23 RX ORDER — DEXAMETHASONE 0.5 MG/5ML
8 ELIXIR ORAL ONCE
Refills: 0 | Status: COMPLETED | OUTPATIENT
Start: 2024-04-23 | End: 2024-04-23

## 2024-04-23 RX ADMIN — Medication 15 MILLIGRAM(S): at 22:30

## 2024-04-23 RX ADMIN — Medication 1 TABLET(S): at 21:28

## 2024-04-23 RX ADMIN — Medication 15 MILLIGRAM(S): at 21:28

## 2024-04-23 RX ADMIN — Medication 1000 MILLIGRAM(S): at 22:48

## 2024-04-23 RX ADMIN — SODIUM CHLORIDE 1000 MILLILITER(S): 9 INJECTION INTRAMUSCULAR; INTRAVENOUS; SUBCUTANEOUS at 21:28

## 2024-04-23 RX ADMIN — Medication 101.6 MILLIGRAM(S): at 21:28

## 2024-04-23 NOTE — ED ADULT NURSE NOTE - OBJECTIVE STATEMENT
23y female PMH myocarditis to the ED from home via triage c/o of sore throat. pt reports noted a sore throat that started at 4pm. Pt reports feeling lightheaded and dizzy as well. Pt reports that roommate was recently sick as well. Pt endorses some chest pain and shortness of breath which concerns pt as she has hx of myocarditis. Pt was seen at urgent care and had a negative strep test. Pt very upset and noncompliant with physical exam. Pt throat red upon visual inspection. Mildly febrile in triage. Stretcher in lowest position and locked, appropriate side rails in place, room cleared of clutter and safety hazards,  blankets given for comfort

## 2024-04-23 NOTE — ED PROVIDER NOTE - PATIENT PORTAL LINK FT
You can access the FollowMyHealth Patient Portal offered by [facility] by registering at the following website: [email or web address]. By joining "[employer]’s FollowMyHealth portal, you will also be able to view your health information using other applications (apps) compatible with our system.

## 2024-04-23 NOTE — ED PROVIDER NOTE - OBJECTIVE STATEMENT
24 y/o f with pmh of myocarditis 3/2024 presents to ED with throat pain, difficulty swallowing x 1 day.  Pt seen at [facility] and had strep, flu, covid neg.  Pt referred to ED secondary to hx of myocarditis.  In ED pt denying active cp, dyspnea.  No recent cough or congestion.  Able to tolerate liquids and solids.  Pt did not take anything for fever or pain.

## 2024-04-23 NOTE — ED ADULT NURSE NOTE - CHIEF COMPLAINT QUOTE
Pt presents to ED from [facility] C/O " excruciating throat pain with trouble swallowing, CP, L arm pain and L arm numbness x 24 hours" Pt has hx of recent myocarditis D/C from [facility]. States, " I had an elevated troponin when I was here last time, I thought I was having a heart attack or something". Reports negative FLU, strep, and COV test from [facility]. Denies drug/ alcohol use. EKG in progress.

## 2024-04-23 NOTE — ED PROVIDER NOTE - HOW PATIENT ADDRESSED, PROFILE
Reason For Visit  PAUL PERES is here today for a nurse visit for blood work Collected 1 gold, 1 lav.      Current Meds   1. Calcium 600/Vitamin D 600-400 MG-UNIT Oral Tablet; TAKE 1 TABLET EVERY 12   HOURS;   Therapy: 18Nov2010 to (Last Rx:18Nov2010)  Requested for: 18Nov2010 Ordered   2. Hair/Skin/Nails Oral Tablet; TAKE 1 TABLET DAILY;   Therapy: 30Jun2015 to (Evaluate:[id number]); Last Rx:30Jun2015 Ordered   3. Lidocaine 5 % External Ointment; APPLY 3 INCH 3 TIMES DAILY;   Therapy: 29Apr2016 to (Evaluate:01Jun2018)  Requested for: 06Jun2017; Last   Rx:06Jun2017 Ordered   4. Magnesium Oxide 250 MG Oral Tablet;   Therapy: (Recorded:[id number]) to Recorded   5. Metamucil 0.52 GM Oral Capsule;   Therapy: (Recorded:31Wel4146) to Recorded   6. Omega 3 1000 MG Oral Capsule; TAKE 1 CAPSULE DAILY;   Therapy: 18Nov2010 to (Last Rx:18Nov2010)  Requested for: 18Nov2010 Ordered   7. Premarin 0.45 MG Oral Tablet;   Therapy: [id number] to  Requested for: [id number] Recorded   8. Restasis EMUL;   Therapy: (Recorded:97Tuy4700) to Recorded   9. Vitamin C 500 MG Oral Tablet;   Therapy: (Recorded:06Vce0343) to Recorded   10. Vitamin D3 TABS;    Therapy: (Recorded:28Gdd8753) to Recorded   11. Zinc 50 MG Oral Tablet; TAKE 1 TABLET DAILY;    Therapy: 06Jun2017 to (Evaluate:07Zwf0875); Last Rx:06Jun2017 Ordered   12. Zolpidem Tartrate 10 MG Oral Tablet; TAKE 1 TABLET AS NEEDED AT BEDTIME;    Therapy: 25Apr2016 to (Evaluate:40Blq8517); Last Rx:25Apr2016 Ordered    Allergies  Cipro TABS  Clindamycin  Norco TABS  Penicillins  tramadol  vancomycin    Plan   1. LIPID PNL W/ RFX; Status:Active; Requested for:07Jun2017;     Signatures   Electronically signed by : Tiny Harrison L.P.N.; Jun 7 2017 12:01PM CST     Ms. Mcgee

## 2024-04-23 NOTE — ED ADULT NURSE NOTE - NSSEPSISSUSPECTED_ED_A_ED
RN spoke with Dr. Rader orders placed for MRI of brain for epilepsy protocol, routine EEG and seizure precautions.    No

## 2024-04-23 NOTE — ED PROVIDER NOTE - PHYSICAL EXAMINATION
VITAL SIGNS: I have reviewed nursing notes and confirm.  CONSTITUTIONAL: Well-developed; well-nourished; in no acute distress.  SKIN: Agree with RN documentation regarding decubitus evaluation. Remainder of skin exam is warm and dry, no acute rash.  HEAD: Normocephalic; atraumatic.  EYES: PERRL, EOM intact; conjunctiva and sclera clear.  ENT: pharynx - erythematous with exudates, no uvula deviation or evidence of PTA  NECK: Supple; non tender.  CARD: S1, S2 normal; no murmurs, gallops, or rubs. Regular rate and rhythm.  RESP: No wheezes, rales or rhonchi.  ABD: Normal bowel sounds; soft; non-distended; non-tender; no hepatosplenomegaly.  EXT: Normal ROM. No clubbing, cyanosis or edema.  LYMPH: No acute cervical adenopathy.  NEURO: Alert, oriented. Grossly unremarkable.  PSYCH: Cooperative, appropriate.

## 2024-04-23 NOTE — ED ADULT NURSE NOTE - NSFALLUNIVINTERV_ED_ALL_ED
Bed/Stretcher in lowest position, wheels locked, appropriate side rails in place/Call bell, personal items and telephone in reach/Instruct patient to call for assistance before getting out of bed/chair/stretcher/Non-slip footwear applied when patient is off stretcher/[place] to call system/Physically safe environment - no spills, clutter or unnecessary equipment/Purposeful proactive rounding/Room/bathroom lighting operational, light cord in reach

## 2024-04-23 NOTE — ED ADULT TRIAGE NOTE - CHIEF COMPLAINT QUOTE
Pt presents to ED from [facility] C/O " excruciating throat pain with trouble swallowing, CP, L arm pain and L arm numbness x 24 hours" Pt has hx of recent myocarditis D/C from [facility]. States, " I had an elevated troponin when I was here last time, I thought I was having a heart attack or something". Reports negative FLU, strep, and COV test from [facility]. Bonny drug/ alcohol use. EKG in progress. Pt presents to ED from [facility] C/O " excruciating throat pain with trouble swallowing, CP, L arm pain and L arm numbness x 24 hours" Pt has hx of recent myocarditis D/C from [facility]. States, " I had an elevated troponin when I was here last time, I thought I was having a heart attack or something". Reports negative FLU, strep, and COV test from [facility]. Denies drug/ alcohol use. EKG in progress.

## 2024-04-23 NOTE — ED PROVIDER NOTE - NSFOLLOWUPINSTRUCTIONS_ED_ALL_ED_FT
Take Augmentin as prescribed.  Take Ibuprofen and/or Tylenol as needed for fever and pain.  Return to ED with worsening sore throat, inability to tolerate liquids or solids or other concerns.  Stay well and feel better.    Pharyngitis  Upper body outline including the pharynx.  Pharyngitis is inflammation of the throat (pharynx). It is a very common cause of sore throat. Pharyngitis can be caused by a bacteria, but it is usually caused by a virus. Most cases of pharyngitis get better on their own without treatment.    What are the causes?  This condition may be caused by:  Infection by viruses (viral). Viral pharyngitis spreads easily from person to person (is contagious) through coughing, sneezing, and sharing of personal items or utensils such as cups, forks, spoons, and toothbrushes.  Infection by bacteria (bacterial). Bacterial pharyngitis may be spread by touching the nose or face after coming in contact with the bacteria, or through close contact, such as kissing.  Allergies. Allergies can cause buildup of mucus in the throat (post-nasal drip), leading to inflammation and irritation. Allergies can also cause blocked nasal passages, forcing breathing through the mouth, which dries and irritates the throat.  What increases the risk?  You are more likely to develop this condition if:  You are 5–24 years old.  You are exposed to crowded environments such as , school, or dormitory living.  You live in a cold climate.  You have a weakened disease-fighting (immune) system.  What are the signs or symptoms?  Symptoms of this condition vary by the cause. Common symptoms of this condition include:  Sore throat.  Fatigue.  Low-grade fever.  Stuffy nose (nasal congestion) and cough.  Headache.  Other symptoms may include:  Glands in the neck (lymph nodes) that are swollen.  Skin rashes.  Plaque-like film on the throat or tonsils. This is often a symptom of bacterial pharyngitis.  Vomiting.  Red, itchy eyes (conjunctivitis).  Loss of appetite.  Joint pain and muscle aches.  Enlarged tonsils.  How is this diagnosed?  This condition may be diagnosed based on your medical history and a physical exam. Your health care provider will ask you questions about your illness and your symptoms.    A swab of your throat may be done to check for bacteria (rapid strep test). Other lab tests may also be done, depending on the suspected cause, but these are rare.    How is this treated?  Many times, treatment is not needed for this condition. Pharyngitis usually gets better in 3–4 days without treatment.    Bacterial pharyngitis may be treated with antibiotic medicines.    Follow these instructions at home:  Medicines    Take over-the-counter and prescription medicines only as told by your health care provider.  If you were prescribed an antibiotic medicine, take it as told by your health care provider. Do not stop taking the antibiotic even if you start to feel better.  Use throat sprays to soothe your throat as told by your health care provider.  Children can get pharyngitis. Do not give your child aspirin because of the association with Reye's syndrome.  Managing pain    A cup of hot tea.  To help with pain, try:  Sipping warm liquids, such as broth, herbal tea, or warm water.  Eating or drinking cold or frozen liquids, such as frozen ice pops.  Gargling with a mixture of salt and water 3–4 times a day or as needed. To make salt water, completely dissolve ½–1 tsp (3–6 g) of salt in 1 cup (237 mL) of warm water.  Sucking on hard candy or throat lozenges.  Putting a cool-mist humidifier in your bedroom at night to moisten the air.  Sitting in the bathroom with the door closed for 5–10 minutes while you run hot water in the shower.  General instructions    A do not smoke cigarettes sign.  Do not use any products that contain nicotine or tobacco. These products include cigarettes, chewing tobacco, and vaping devices, such as e-cigarettes. If you need help quitting, ask your health care provider.  Rest as told by your health care provider.  Drink enough fluid to keep your urine pale yellow.  How is this prevented?  To help prevent becoming infected or spreading infection:  Wash your hands often with soap and water for at least 20 seconds. If soap and water are not available, use hand .  Do not touch your eyes, nose, or mouth with unwashed hands, and wash hands after touching these areas.  Do not share cups or eating utensils.  Avoid close contact with people who are sick.  Contact a health care provider if:  You have large, tender lumps in your neck.  You have a rash.  You cough up green, yellow-brown, or bloody mucus.  Get help right away if:  Your neck becomes stiff.  You drool or are unable to swallow liquids.  You cannot drink or take medicines without vomiting.  You have severe pain that does not go away, even after you take medicine.  You have trouble breathing, and it is not caused by a stuffy nose.  You have new pain and swelling in your joints such as the knees, ankles, wrists, or elbows.  These symptoms may represent a serious problem that is an emergency. Do not wait to see if the symptoms will go away. Get medical help right away. Call your local emergency services (911 in the [place] Do not drive yourself to the hospital.    Summary  Pharyngitis is redness, pain, and swelling (inflammation) of the throat (pharynx).  While pharyngitis can be caused by a bacteria, the most common causes are viral.  Most cases of pharyngitis get better on their own without treatment.  Bacterial pharyngitis is treated with antibiotic medicines.  This information is not intended to replace advice given to you by your health care provider. Make sure you discuss any questions you have with your health care provider.    Document Revised: 03/16/2022 Document Reviewed: 03/16/2022  [employer] Patient Education © 2024 [employer]  [employer] logo  Terms and Conditions  Privacy Policy  Editorial Policy  All content on this site: Copyright © 2024 [employer], its licensors, and contributors. All rights are reserved, including those for text and data mining, AI training, and similar technologies. For all open access content, the Creative Commons licensing terms apply.  Cookies are used by this site. To decline or learn more, visit our Cookies page.

## 2024-04-24 PROBLEM — Z78.9 OTHER SPECIFIED HEALTH STATUS: Chronic | Status: ACTIVE | Noted: 2024-03-23

## 2024-04-25 DIAGNOSIS — R07.0 PAIN IN THROAT: ICD-10-CM

## 2024-04-25 DIAGNOSIS — Z86.79 PERSONAL HISTORY OF OTHER DISEASES OF THE CIRCULATORY SYSTEM: ICD-10-CM

## 2024-04-25 DIAGNOSIS — J02.9 ACUTE PHARYNGITIS, UNSPECIFIED: ICD-10-CM

## 2024-04-25 RX ORDER — IBUPROFEN 200 MG
1 TABLET ORAL
Qty: 42 | Refills: 0
Start: 2024-04-25 | End: 2024-05-08

## 2024-04-29 LAB
CULTURE RESULTS: SIGNIFICANT CHANGE UP
CULTURE RESULTS: SIGNIFICANT CHANGE UP
SPECIMEN SOURCE: SIGNIFICANT CHANGE UP
SPECIMEN SOURCE: SIGNIFICANT CHANGE UP

## 2024-04-29 NOTE — BH CONSULTATION LIAISON ASSESSMENT NOTE - NSICDXBHPRIMARYDX_PSY_ALL_CORE
ATC patient to let her know her endo is scheduled for the date and time that Dr Oneal told her.  6/4 @ 10:30  I was not able to leave a VM due to VM not set up.  I sent a letter to the patient with arrival time and  other information .  
Borderline personality disorder   F60.3

## 2024-05-13 PROBLEM — Z00.00 ENCOUNTER FOR PREVENTIVE HEALTH EXAMINATION: Status: ACTIVE | Noted: 2024-05-13

## 2024-05-13 NOTE — PHYSICAL EXAM
[Well Developed] : well developed [Well Nourished] : well nourished [No Acute Distress] : no acute distress [Normal Conjunctiva] : normal conjunctiva [Normal Venous Pressure] : normal venous pressure [No Carotid Bruit] : no carotid bruit [Normal S1, S2] : normal S1, S2 [No Murmur] : no murmur [No Rub] : no rub [No Gallop] : no gallop [place] [Clear Lung Fields] : clear lung fields [Good Air Entry] : good air entry [No Respiratory Distress] : no respiratory distress  [Soft] : abdomen soft [Non Tender] : non-tender [No Masses/organomegaly] : no masses/organomegaly [Normal Bowel Sounds] : normal bowel sounds [Normal Gait] : normal gait [No Edema] : no edema [No Cyanosis] : no cyanosis [No Clubbing] : no clubbing [No Varicosities] : no varicosities [No Rash] : no rash [No Skin Lesions] : no skin lesions [Moves all extremities] : moves all extremities [No Focal Deficits] : no focal deficits [Normal Speech] : normal speech [Alert and Oriented] : alert and oriented [Normal memory] : normal memory

## 2024-05-14 ENCOUNTER — APPOINTMENT (OUTPATIENT)
Dept: HEART AND VASCULAR | Facility: CLINIC | Age: 24
End: 2024-05-14
Payer: COMMERCIAL

## 2024-05-14 ENCOUNTER — NON-APPOINTMENT (OUTPATIENT)
Age: 24
End: 2024-05-14

## 2024-05-14 VITALS
DIASTOLIC BLOOD PRESSURE: 74 MMHG | TEMPERATURE: 98.5 F | BODY MASS INDEX: 26.05 KG/M2 | HEIGHT: 63 IN | SYSTOLIC BLOOD PRESSURE: 102 MMHG | HEART RATE: 73 BPM | OXYGEN SATURATION: 99 % | WEIGHT: 147 LBS

## 2024-05-14 DIAGNOSIS — Z83.3 FAMILY HISTORY OF DIABETES MELLITUS: ICD-10-CM

## 2024-05-14 DIAGNOSIS — I51.4 MYOCARDITIS, UNSPECIFIED: ICD-10-CM

## 2024-05-14 DIAGNOSIS — Z82.3 FAMILY HISTORY OF STROKE: ICD-10-CM

## 2024-05-14 DIAGNOSIS — Z78.9 OTHER SPECIFIED HEALTH STATUS: ICD-10-CM

## 2024-05-14 DIAGNOSIS — Z82.49 FAMILY HISTORY OF ISCHEMIC HEART DISEASE AND OTHER DISEASES OF THE CIRCULATORY SYSTEM: ICD-10-CM

## 2024-05-14 DIAGNOSIS — F17.200 NICOTINE DEPENDENCE, UNSPECIFIED, UNCOMPLICATED: ICD-10-CM

## 2024-05-14 PROCEDURE — 99205 OFFICE O/P NEW HI 60 MIN: CPT

## 2024-05-14 PROCEDURE — 93000 ELECTROCARDIOGRAM COMPLETE: CPT

## 2024-05-14 PROCEDURE — 99215 OFFICE O/P EST HI 40 MIN: CPT | Mod: 25

## 2024-05-14 PROCEDURE — G2211 COMPLEX E/M VISIT ADD ON: CPT

## 2024-05-14 NOTE — HISTORY OF PRESENT ILLNESS
[FreeTextEntry1] : 22 yo lady PMHx of myocarditis (+hsTrop and evidence of myocarditis on cardiac MRI) March/2024 who is here as a new patient.  ROS + mild dyspnea on exertion.   PMHx/PSHx as above FMHx: father with CAD s/p PCI Social hx: prior tobacco use; intermittent recreational drug use

## 2024-05-14 NOTE — DISCUSSION/SUMMARY
[FreeTextEntry1] : 24 yo lady PMHx of myocarditis (+hsTrop and evidence of myocarditis on cardiac MRI) March/2024 who is here as a new patient.  Assessment 1. Myocarditis - etiology unknown March/2024 [facility] admission: HS Trop T 2241--> 4700, Sed Rate 50, , EKG NSR with TWI V2-V3 CCTA 3/25/24: Ca score 0, normal coronaries TTE 3/25/24: EF 60-65%; normal cMRI 3/26/24: LV normal in size, LVEF 65%. RVEF 57%. No significant valve disease, trace pericardial effusion. There is subepicardial enhancement of the basal inferolateral with extension into the basal inferior and basal anterolateral wall segments. This is a non-ischemic pattern and may be seen in the setting of myocarditis.  Euvolemic on exam with no evidence of dec HF/myocarditis  Plan 1. Recheck ESR, CRP, proBNP and hsTrop 2. EKG 5/14/24: NSR 3. Repeat TTE  4. RTC in 1mo for TTE 5. No further colchicine and ibuprofen needed  During non face-to-face time, I reviewed relevant portions of the patient's medical record. During face-to-face time, I took a relevant history and examined the patient. I also explained differential diagnoses, relevant cardiac diagnoses, workup, and management plan. Total time spent 60 minutes.   Nirav Shaw M.D. Attending Cardiologist [facility]   [EKG obtained to assist in diagnosis and management of assessed problem(s)] : EKG obtained to assist in diagnosis and management of assessed problem(s)

## 2024-05-29 LAB
CRP SERPL-MCNC: 14 MG/L
ERYTHROCYTE [SEDIMENTATION RATE] IN BLOOD BY WESTERGREN METHOD: 44 MM/HR
NT-PROBNP SERPL-MCNC: <36 PG/ML
TROPONIN-T, HIGH SENSITIVITY: 10 NG/L

## 2024-06-18 ENCOUNTER — NON-APPOINTMENT (OUTPATIENT)
Age: 24
End: 2024-06-18

## 2024-06-18 ENCOUNTER — APPOINTMENT (OUTPATIENT)
Dept: HEART AND VASCULAR | Facility: CLINIC | Age: 24
End: 2024-06-18
Payer: COMMERCIAL

## 2024-06-18 VITALS
HEIGHT: 63 IN | OXYGEN SATURATION: 98 % | SYSTOLIC BLOOD PRESSURE: 105 MMHG | WEIGHT: 148 LBS | DIASTOLIC BLOOD PRESSURE: 70 MMHG | HEART RATE: 85 BPM | BODY MASS INDEX: 26.22 KG/M2

## 2024-06-18 PROCEDURE — 93306 TTE W/DOPPLER COMPLETE: CPT

## 2024-06-18 PROCEDURE — 99214 OFFICE O/P EST MOD 30 MIN: CPT

## 2024-06-18 PROCEDURE — G2211 COMPLEX E/M VISIT ADD ON: CPT | Mod: NC

## 2024-06-19 NOTE — HISTORY OF PRESENT ILLNESS
[FreeTextEntry1] : 22 yo lady PMHx of myocarditis (+hsTrop and evidence of myocarditis on cardiac MRI) March/2024 who is here as a new patient.  06/18/24 ROS negative 05/14/24 ROS + mild dyspnea on exertion.

## 2024-06-19 NOTE — DISCUSSION/SUMMARY
[FreeTextEntry1] : 24 yo lady PMHx of myocarditis (+hsTrop and evidence of myocarditis on cardiac MRI) March/2024 who is here as a follow up  Assessment 1. Myocarditis - etiology unknown March/2024 [facility] admission: HS Trop T 2241--> 4700, Sed Rate 50, , EKG NSR with TWI V2-V3 CCTA 3/25/24: Ca score 0, normal coronaries TTE 3/25/24: EF 60-65%; normal cMRI 3/26/24: LV normal in size, LVEF 65%. RVEF 57%. No significant valve disease, trace pericardial effusion. There is subepicardial enhancement of the basal inferolateral with extension into the basal inferior and basal anterolateral wall segments. This is a non-ischemic pattern and may be seen in the setting of myocarditis.  Euvolemic on exam with no evidence of dec HF/myocarditis  Plan 1. Rechecked proBNP and hsTrop --> normalized 2. ESR and CRP remain mildly elevaed but she is no longer symptomatic so no need to restart colchicine and ibuprofen 3. Repeat TTE done today, will review  During non face-to-face time, I reviewed relevant portions of the patient's medical record. During face-to-face time, I took a relevant history and examined the patient. I also explained differential diagnoses, relevant cardiac diagnoses, workup, and management plan. Total time spent 60 minutes.   Nirav Shaw M.D. Attending Cardiologist [facility]